# Patient Record
Sex: FEMALE | Race: WHITE | Employment: FULL TIME | URBAN - METROPOLITAN AREA
[De-identification: names, ages, dates, MRNs, and addresses within clinical notes are randomized per-mention and may not be internally consistent; named-entity substitution may affect disease eponyms.]

---

## 2022-02-08 ENCOUNTER — HOSPITAL ENCOUNTER (EMERGENCY)
Age: 32
Discharge: HOME | End: 2022-02-08
Attending: STUDENT IN AN ORGANIZED HEALTH CARE EDUCATION/TRAINING PROGRAM
Payer: MEDICARE

## 2022-02-08 ENCOUNTER — APPOINTMENT (OUTPATIENT)
Dept: GENERAL RADIOLOGY | Age: 32
End: 2022-02-08
Payer: MEDICARE

## 2022-02-08 VITALS
HEIGHT: 67 IN | DIASTOLIC BLOOD PRESSURE: 81 MMHG | HEART RATE: 83 BPM | WEIGHT: 217 LBS | RESPIRATION RATE: 12 BRPM | OXYGEN SATURATION: 100 % | TEMPERATURE: 98.3 F | SYSTOLIC BLOOD PRESSURE: 141 MMHG | BODY MASS INDEX: 34.06 KG/M2

## 2022-02-08 DIAGNOSIS — U07.1 COVID-19: Primary | ICD-10-CM

## 2022-02-08 LAB
SARS-COV-2, RAPID: DETECTED
SPECIMEN DESCRIPTION: ABNORMAL

## 2022-02-08 PROCEDURE — 99283 EMERGENCY DEPT VISIT LOW MDM: CPT

## 2022-02-08 PROCEDURE — 71045 X-RAY EXAM CHEST 1 VIEW: CPT

## 2022-02-08 PROCEDURE — 87635 SARS-COV-2 COVID-19 AMP PRB: CPT

## 2022-02-08 NOTE — ED PROVIDER NOTES
eMERGENCY dEPARTMENT eNCOUnter   Independent Attestation     Pt Name: Blaine Bernstein  MRN: [id number]  [place] 1990  Date of evaluation: 2/8/22     Blaine Bernstein is a 36 y.o. female with CC: Cough and Generalized Body Aches      Based on the medical record the care appears appropriate. I was personally available for consultation in the Emergency Department. The care is provided during an unprecedented national emergency due to the novel coronavirus, COVID 19.     Didier Franco MD  Attending Emergency Physician                    Didier Franco MD  02/08/22 2896

## 2022-02-08 NOTE — ED TRIAGE NOTES
Mode of arrival (squad #, walk in, police, etc) : walk in         Chief complaint(s): Cough, Chest congestion, generalized body aches        Arrival Note (brief scenario, treatment PTA, etc). : patient arrived to ED from home with C/O cough, chest congestion, and generalized body aches. Patient states she began to feel sick this past Saturday and would like to be tested for COVID 19. Patient denies any exposure to a positive covid person. Patient denies any fevers, chills, N/V/D, or abd pain. C= \"Have you ever felt that you should Cut down on your drinking? \"  No  A= \"Have people Annoyed you by criticizing your drinking? \"  No  G= \"Have you ever felt bad or Guilty about your drinking? \"  No  E= \"Have you ever had a drink as an Eye-opener first thing in the morning to steady your nerves or to help a hangover? \"  No      Deferred []      Reason for deferring: N/A    *If yes to two or more: probable alcohol abuse. *

## 2022-02-08 NOTE — Clinical Note
Alicia Kingsley was seen and treated in our emergency department on 2/8/2022. She may return to work on 02/15/2022. If you have any questions or concerns, please don't hesitate to call.       Hiro Cherry PA-C

## 2022-02-08 NOTE — ED PROVIDER NOTES
[facility] [address] [facility] ED  eMERGENCY dEPARTMENT eNCOUnter      Pt Name: Pattie Homans  MRN: [id number]  [place] 1990  Date of evaluation: 2022  Provider: Herman Hollins PA-C    CHIEF COMPLAINT       Chief Complaint   Patient presents with    Cough    Generalized Body Aches           HISTORY OF PRESENT ILLNESS  (Location/Symptom, Timing/Onset, Context/Setting, Quality, Duration, Modifying Factors, Severity.)   Pattie Homans is a 36 y.o. female who presents to the emergency department for evaluation of cough, body aches, chills, sore throat, nausea x4 days. Patient states that her father became sick 5 days ago when she was caring for him. Reports the next day she she became sick. She states she did have a fever yesterday. Reports no fever today. Patient states she is coughing up a lot of mucus. She denies any chest pain, shortness of breath, vomiting, abdominal pain, diarrhea, headache. Patient is concerned for Covid. She is not vaccinated. Patient does vape. Denies any other medical problems. No other complaints. Nursing Notes were reviewed. REVIEW OF SYSTEMS    (2-9 systems for level 4, 10 or more for level 5)     Review of Systems   Cough, body aches, chills, sore throat, nausea, fever    Except as noted above the remainder of the review of systems was reviewed and negative. PAST MEDICAL HISTORY   History reviewed. No pertinent past medical history. None otherwise stated in nurses notes    SURGICAL HISTORY       Past Surgical History:   Procedure Laterality Date     SECTION       None otherwise stated in nurses notes    CURRENT MEDICATIONS       Previous Medications    No medications on file       ALLERGIES     Patient has no known allergies. FAMILY HISTORY     History reviewed. No pertinent family history. No family status information on file.       None otherwise stated in nurses notes    SOCIAL HISTORY      reports that she has been smoking cigarettes and e-cigarettes. She has been smoking about 1.00 pack per day. She has never used smokeless tobacco. She reports current alcohol use. She reports current drug use. Frequency: 1.00 time per week. Drug: Marijuana [place]). lives at home with others     PHYSICAL EXAM    (up to 7 for level 4, 8 or more for level 5)     ED Triage Vitals [02/08/22 1334]   BP Temp Temp Source Pulse Resp SpO2 Height Weight   (!) 141/81 98.3 °F (36.8 °C) Oral 83 12 100 % 5' 7\" (1.702 m) 217 lb (98.4 kg)       Physical Exam   Nursing note and vitals reviewed. Constitutional: Oriented to person, place, and time and well-developed, well-nourished. Head: Normocephalic and atraumatic. Ear: External ears normal.   Nose: Nose normal and midline. Eyes: Conjunctivae and EOM are normal. Pupils are equal, round, and reactive to light. Neck: Normal range of motion. Neck supple. Throat: Posterior pharynx is erythematous with no tonsillar swelling. Uvula is midline. No exudates. Airway is patent. No peritonsillar abscess. Cardiovascular: Normal rate, regular rhythm, normal heart sounds and intact distal pulses. Pulmonary/Chest: Effort normal and breath sounds normal. No respiratory distress. No wheezes. No rales. No Rhonchi. No chest tenderness. Abdominal: Soft. No distension and no mass. There is no tenderness. There is no rebound and no guarding. Musculoskeletal: Normal range of motion. Neurological: Alert and oriented to person, place, and time. GCS score is 15. Skin: Skin is warm and dry. No rash noted. No erythema. No pallor.    Psychiatric: Mood, memory, affect and judgment normal.           DIAGNOSTIC RESULTS     EKG: All EKG's are interpreted by the Emergency Department Physician who either signs or Co-signs this chart in the absence of a cardiologist.        RADIOLOGY:   All plain film, CT, MRI, and formal ultrasound images (except ED bedside ultrasound) are read by the radiologist, see reports below, unless otherwise noted in MDM or here. XR CHEST PORTABLE   Final Result   No acute process. No results found. LABS:  Labs Reviewed   COVID-19, RAPID - Abnormal; Notable for the following components:       Result Value    SARS-CoV-2, Rapid DETECTED (*)     All other components within normal limits       All other labs were within normal range or not returned as of this dictation. EMERGENCY DEPARTMENT COURSE and DIFFERENTIAL DIAGNOSIS/MDM:   Vitals:    Vitals:    02/08/22 1334   BP: (!) 141/81   Pulse: 83   Resp: 12   Temp: 98.3 °F (36.8 °C)   TempSrc: Oral   SpO2: 100%   Weight: 217 lb (98.4 kg)   Height: 5' 7\" (1.702 m)         Patient instructed to return to the emergency room if symptoms worsen, return, or any other concern right away which is agreed by the patient    ED MEDS:  No orders of the defined types were placed in this encounter. CONSULTS:  None    PROCEDURES:  None      FINAL IMPRESSION      1. COVID-19          DISPOSITION/PLAN   DISPOSITION Decision To Discharge    PATIENT REFERRED TO:  [facility] SPECIALIZED SURGERY  [address]  [facility] [address] [phone]  Call       [facility] [address] ED  [address]  [facility] [address] [phone]          DISCHARGE MEDICATIONS:  New Prescriptions    No medications on file         Summation      Patient Course:    URI symptoms x 4 days. Father also sick. No cp, sob, abd pain, emesis. Pt is well appearing. NAD.  VSS. covid is +   Chest xray unremarkable. Recommend OTC medications. Discussed quarantine. Discussed results and plan with the pt. They expressed appropriate understanding. Pt given close follow up, supportive care instructions and strict return instructions at the bedside. The care is provided during an unprecedented national emergency due to the novel coronavirus, COVID-19.       ED Medications administered this visit:  Medications - No data to display    New Prescriptions from this visit: New Prescriptions    No medications on file       Follow-up:  [facility] SPECIALIZED SURGERY  [address]  [address] [facility] ED  [place] [address]  [phone]            Final Impression:   1.  COVID-19               (Please note that portions of this note were completed with a voice recognition program )        Gillian Arango, PA-C  02/08/22 4096

## 2022-02-08 NOTE — ED NOTES
Pt given instructions for follow-up and discharge. Pt verbalizes understanding. Pt is A&O x4, PWD, eupneic, and ambulatory with steady, even gait upon discharge.         Lacie Thompson RN  02/08/22 0983

## 2022-02-08 NOTE — Clinical Note
Thomas Hameed was seen and treated in our emergency department on 2/8/2022. She may return to work on 02/15/2022. If you have any questions or concerns, please don't hesitate to call.       Roselyn Crespo PA-C

## 2023-04-25 ENCOUNTER — OFFICE VISIT (OUTPATIENT)
Dept: PRIMARY CARE CLINIC | Age: 33
End: 2023-04-25

## 2023-04-25 VITALS
HEART RATE: 82 BPM | WEIGHT: 259.6 LBS | OXYGEN SATURATION: 100 % | HEIGHT: 69 IN | SYSTOLIC BLOOD PRESSURE: 136 MMHG | DIASTOLIC BLOOD PRESSURE: 84 MMHG | BODY MASS INDEX: 38.45 KG/M2

## 2023-04-25 DIAGNOSIS — Z59.82 TRANSPORTATION INSECURITY: ICD-10-CM

## 2023-04-25 DIAGNOSIS — M54.50 CHRONIC MIDLINE LOW BACK PAIN WITHOUT SCIATICA: ICD-10-CM

## 2023-04-25 DIAGNOSIS — G89.29 CHRONIC MIDLINE LOW BACK PAIN WITHOUT SCIATICA: ICD-10-CM

## 2023-04-25 DIAGNOSIS — M25.561 PAIN IN BOTH KNEES, UNSPECIFIED CHRONICITY: ICD-10-CM

## 2023-04-25 DIAGNOSIS — G62.9 NEUROPATHY: ICD-10-CM

## 2023-04-25 DIAGNOSIS — M25.562 PAIN IN BOTH KNEES, UNSPECIFIED CHRONICITY: ICD-10-CM

## 2023-04-25 DIAGNOSIS — F81.9 LEARNING DISABILITY: ICD-10-CM

## 2023-04-25 DIAGNOSIS — F41.9 ANXIETY: ICD-10-CM

## 2023-04-25 DIAGNOSIS — R10.9 ABDOMINAL PAIN, UNSPECIFIED ABDOMINAL LOCATION: ICD-10-CM

## 2023-04-25 DIAGNOSIS — Z13.1 SCREENING FOR DIABETES MELLITUS: ICD-10-CM

## 2023-04-25 DIAGNOSIS — Z23 NEED FOR VACCINATION: ICD-10-CM

## 2023-04-25 DIAGNOSIS — Z76.89 ENCOUNTER TO ESTABLISH CARE: Primary | ICD-10-CM

## 2023-04-25 DIAGNOSIS — R73.03 PRE-DIABETES: ICD-10-CM

## 2023-04-25 DIAGNOSIS — E78.5 HYPERLIPIDEMIA, UNSPECIFIED HYPERLIPIDEMIA TYPE: ICD-10-CM

## 2023-04-25 RX ORDER — GABAPENTIN 300 MG/1
300 CAPSULE ORAL 3 TIMES DAILY
COMMUNITY

## 2023-04-25 SDOH — ECONOMIC STABILITY: FOOD INSECURITY: WITHIN THE PAST 12 MONTHS, YOU WORRIED THAT YOUR FOOD WOULD RUN OUT BEFORE YOU GOT MONEY TO BUY MORE.: NEVER TRUE

## 2023-04-25 SDOH — ECONOMIC STABILITY: HOUSING INSECURITY
IN THE LAST 12 MONTHS, WAS THERE A TIME WHEN YOU DID NOT HAVE A STEADY PLACE TO SLEEP OR SLEPT IN A SHELTER (INCLUDING NOW)?: NO

## 2023-04-25 SDOH — ECONOMIC STABILITY: FOOD INSECURITY: WITHIN THE PAST 12 MONTHS, THE FOOD YOU BOUGHT JUST DIDN'T LAST AND YOU DIDN'T HAVE MONEY TO GET MORE.: NEVER TRUE

## 2023-04-25 SDOH — ECONOMIC STABILITY - TRANSPORTATION SECURITY: TRANSPORTATION INSECURITY: Z59.82

## 2023-04-25 SDOH — ECONOMIC STABILITY: INCOME INSECURITY: HOW HARD IS IT FOR YOU TO PAY FOR THE VERY BASICS LIKE FOOD, HOUSING, MEDICAL CARE, AND HEATING?: NOT HARD AT ALL

## 2023-04-25 ASSESSMENT — ENCOUNTER SYMPTOMS
ABDOMINAL DISTENTION: 0
SORE THROAT: 0
PHOTOPHOBIA: 0
ABDOMINAL PAIN: 1
DIARRHEA: 0
VOMITING: 0
CHEST TIGHTNESS: 0
SHORTNESS OF BREATH: 0
CONSTIPATION: 0
COUGH: 0
SINUS PRESSURE: 0
RHINORRHEA: 0

## 2023-04-25 ASSESSMENT — PATIENT HEALTH QUESTIONNAIRE - PHQ9
SUM OF ALL RESPONSES TO PHQ QUESTIONS 1-9: 2
2. FEELING DOWN, DEPRESSED OR HOPELESS: 1
SUM OF ALL RESPONSES TO PHQ QUESTIONS 1-9: 2
1. LITTLE INTEREST OR PLEASURE IN DOING THINGS: 1
SUM OF ALL RESPONSES TO PHQ9 QUESTIONS 1 & 2: 2

## 2023-04-25 NOTE — PROGRESS NOTES
side effects of prescribed medications. All patient questions answered. Pt voiced understanding. Reviewed health maintenance. Instructed to continue current medications, diet and exercise. Patient agreed with treatment plan. Follow up as directed.      Electronically signed by MORENITA Cornelius on 4/25/2023 at 2:32 PM

## 2023-05-09 ENCOUNTER — HOSPITAL ENCOUNTER (OUTPATIENT)
Dept: ULTRASOUND IMAGING | Age: 33
Discharge: HOME | End: 2023-05-11
Payer: MEDICARE

## 2023-05-09 DIAGNOSIS — R10.9 ABDOMINAL PAIN, UNSPECIFIED ABDOMINAL LOCATION: ICD-10-CM

## 2023-05-09 PROCEDURE — 76705 ECHO EXAM OF ABDOMEN: CPT

## 2023-05-17 ENCOUNTER — TELEMEDICINE (OUTPATIENT)
Dept: PRIMARY CARE CLINIC | Age: 33
End: 2023-05-17

## 2023-05-17 ENCOUNTER — SCHEDULED TELEPHONE ENCOUNTER (OUTPATIENT)
Dept: PRIMARY CARE CLINIC | Age: 33
End: 2023-05-17
Payer: MEDICARE

## 2023-05-17 ENCOUNTER — TELEPHONE (OUTPATIENT)
Dept: PRIMARY CARE CLINIC | Age: 33
End: 2023-05-17

## 2023-05-17 DIAGNOSIS — G62.9 NEUROPATHY: Primary | ICD-10-CM

## 2023-05-17 DIAGNOSIS — E66.01 SEVERE OBESITY (BMI 35.0-39.9) WITH COMORBIDITY (HCC): ICD-10-CM

## 2023-05-17 PROCEDURE — 99442 PR PHYS/QHP TELEPHONE EVALUATION 11-20 MIN: CPT | Performed by: PHYSICIAN ASSISTANT

## 2023-05-17 RX ORDER — GABAPENTIN 300 MG/1
300 CAPSULE ORAL 3 TIMES DAILY
Qty: 90 CAPSULE | Refills: 0 | Status: SHIPPED | OUTPATIENT
Start: 2023-05-17 | End: 2023-06-16

## 2023-05-17 RX ORDER — GABAPENTIN 300 MG/1
300 CAPSULE ORAL 3 TIMES DAILY
Qty: 90 CAPSULE | Refills: 0 | Status: SHIPPED | OUTPATIENT
Start: 2023-05-17 | End: 2023-05-17 | Stop reason: SDUPTHER

## 2023-05-17 NOTE — TELEPHONE ENCOUNTER
You just saw her less than a month ago does she need to come in? Neuropathy and chronic back pain were documented and the medication is on her medication list. Please advise.

## 2023-05-17 NOTE — TELEPHONE ENCOUNTER
Please just have patient set up appointment as a telephone visit. I will be modesto to place order thorugh that. I can call her now. Just have to go over a few things about the medication with her.

## 2023-05-17 NOTE — PROGRESS NOTES
Richy Milian is a 34 y.o. female evaluated via telephone on 5/17/2023 for No chief complaint on file. .        Documentation:  I communicated with the patient and/or health care decision maker about gabapentin. Details of this discussion including any medical advice provided: Had been taking 600 mg gabapentin as needed. Patient was educated on controlled substance adverse reactions. No other concerns at this time. Follow up in office in 6 months for med check. PDMP Monitoring:    Last PDMP Patrice Molt as Reviewed:  Review User Review Instant Review Result   DAMIAN MATUTE 4/25/2023  2:12 PM Reviewed PDMP [1]       Urine Drug Screenings (1 yr)    No resulted procedures found. Medication Contract and Consent for Opioid Use Documents Filed        No documents found                      Total Time: minutes: 11-20 minutes    Richy Milian was evaluated through a synchronous (real-time) audio encounter. Patient identification was verified at the start of the visit. She (or guardian if applicable) is aware that this is a billable service, which includes applicable co-pays. This visit was conducted with the patient's (and/or legal guardian's) verbal consent. She has not had a related appointment within my department in the past 7 days or scheduled within the next 24 hours. The patient was located at Home: [facility]  [address]  [address]. The provider was located at [place] [address]): [facility] [address]  [facility] [address]     Note: not billable if this call serves to triage the patient into an appointment for the relevant concern    Green Prabhjot Jefferyma

## 2023-05-18 ENCOUNTER — HOSPITAL ENCOUNTER (OUTPATIENT)
Dept: PHYSICAL THERAPY | Facility: CLINIC | Age: 33
Setting detail: THERAPIES SERIES
Discharge: HOME | End: 2023-05-18
Payer: MEDICARE

## 2023-05-18 PROCEDURE — 97161 PT EVAL LOW COMPLEX 20 MIN: CPT

## 2023-05-18 NOTE — CONSULTS
[address]  Phone: [phone]  Fax: [phone]       Physical Therapy Spine Evaluation    Date:  2023  Patient: Azeb Lee  : 1990  MRN: [id number]  Physician: MORENITA Dacosta   Insurance: MEDICARE ([employer]  Medical Diagnosis: M54.50, G89.29 (ICD-10-CM) - Chronic midline low back pain without sciatica  M25.561, M25.562 (ICD-10-CM) - Pain in both knees, unspecified chronicity  Rehab Codes: M54.5  Onset Date: 23  Next 's appt. : 6/15/23      Subjective:   CC/HPI: Pt reports to PT with low back and flo knee pain. Per pt, she reports that she has been having low back pain ever since  when she was pregnant with her daughter. Pt states that she has issue with her back \"locking up\", noting that she will need help standing back up if she were to bend down to pick something up. Pt also notes that she is unable to sit or stand for long periods d/t pain. Pt reports that heat can help, but does not provide any long-term carryover. Reports that she can also have pin in her upper back at times. Pt states that she had tried PT previously, but states that this was around 2 years ago.      PMHx:   [] Unremarkable               [x] Refer to full medical chart  In [facility]         Radiology: Date Performed: Results:     [] X-RAY     [] MRI     [] Other            Comorbidities:   [] Obesity [] Dialysis  [] N/A   [x] Asthma/COPD [] Dementia [] Other:   [] Stroke [] Sleep apnea [] Other:   [] Vascular disease [] Rheumatic disease [] Other:       Fall Risk:      [x] None   [] PRESENT/See Marques Scale   Medications: [x] Refer to full medical record [] None [] Other:  Allergies:      [] Refer to full medical record [x] None [] Other:        ADL/IADL [] Previously independent with all [] Currently independent with all Who currently assists the patient with task     [x] Previously independent with all except: [x] Currently

## 2023-05-25 ENCOUNTER — HOSPITAL ENCOUNTER (OUTPATIENT)
Dept: PHYSICAL THERAPY | Facility: CLINIC | Age: 33
Setting detail: THERAPIES SERIES
Discharge: HOME | End: 2023-05-25
Payer: MEDICARE

## 2023-05-25 PROCEDURE — 97113 AQUATIC THERAPY/EXERCISES: CPT

## 2023-05-25 NOTE — CARE COORDINATION
Medicare Cap   [x] Physical Therapy  [] Speech Therapy  [] Occupational therapy  *PT and Speech caps combine      $2230 Limit for PT and Speech combined  $2230 Limit for OT individually  At the beginning of the month where you expect to go over $2150, please add the [address] [place] [address] modifier      Patient Name: Fer Alcala: 1990    Note:  This is an estimate of charges billed.      Date of [address] Name # units/ charge $$$ charge Daily Total Charge Ongoing Total $$$           2 aqua    54.32 54.32

## 2023-05-25 NOTE — FLOWSHEET NOTE
[] [address]  [address]  [phone]  F: [phone] [] [address]  [facility]   [address]  P: [phone]  F: [phone] [] [address]  [address] [facility]  P: [phone]  F: [phone] [] [address]  P: [phone]  F: [phone] [] [address]  [facility]   [address]   [place]atherine Ferris: [phone]  F: [phone]      Physical Therapy Daily Aquatic Treatment Note    Date:  2023  Patient Name:  Anh Mooney    :  1990  MRN: [id number]  Physician: MORENITA Hayes                                Insurance: MEDICARE (Med. [employer]ec.)  Medical Diagnosis: M54.50, G89.29 (ICD-10-CM) - Chronic midline low back pain without sciatica  M25.561, M25.562 (ICD-10-CM) - Pain in both knees, unspecified chronicity  Rehab Codes: M54.5  Onset Date: 23               Next 's appt. : 6/15/23  Visit# / total visits: ; Progress note for Medicare patient due at visit 10     Cancels/No Shows:     Subjective:    Pain:  [x] Yes  [] No Location: B knees Pain Rating: (0-10 scale) 3/10  Pain altered Tx:  [] No  [] Yes  Action:  Comments: Pt reports pain bilateral knees today. States she is ready for start PT.      Objective:  Precautions:  KEY  B = Belt G = Gloves P= Paddles   C = Collar K = Kickboard T = Theratube   DB = Dumbells N = Noodle W = Weights     Exercises/Activities  Warm-up/Amb  Dynamic Exercises    Forward 3L March 3L   Sideways 3L Squat    Backwards 3L Retro HS curls      Retro SLR    Stretches  Braiding    Gastroc/Soleus  Heel to Toe amb    Hamstring 3x30\" Toe amb    Hip flexor  Heel amb    Piriformis      SKTC      Pec Stretch      Post Deltoid  Static Exercises UE      Shoulder

## 2023-06-01 ENCOUNTER — HOSPITAL ENCOUNTER (OUTPATIENT)
Dept: PHYSICAL THERAPY | Facility: CLINIC | Age: 33
Setting detail: THERAPIES SERIES
Discharge: HOME | End: 2023-06-01
Payer: MEDICARE

## 2023-06-01 PROCEDURE — 97113 AQUATIC THERAPY/EXERCISES: CPT

## 2023-06-01 NOTE — CARE COORDINATION
Medicare Cap   [x] Physical Therapy  [] Speech Therapy  [] Occupational therapy  *PT and Speech caps combine      $2230 Limit for PT and Speech combined  $2230 Limit for OT individually  At the beginning of the month where you expect to go over $2150, please add the [address] [place] [address] modifier      Patient Name: Kiera Maury: 1990    Note:  This is an estimate of charges billed.      Date of [address] Name # units/ charge $$$ charge Daily Total Charge Ongoing Total $$$           2 aqua    54.32 54.32   2 aqua    54.32 108.64

## 2023-06-08 ENCOUNTER — HOSPITAL ENCOUNTER (OUTPATIENT)
Dept: PHYSICAL THERAPY | Facility: CLINIC | Age: 33
Setting detail: THERAPIES SERIES
Discharge: HOME | End: 2023-06-08
Payer: MEDICARE

## 2023-06-08 PROCEDURE — 97113 AQUATIC THERAPY/EXERCISES: CPT

## 2023-06-08 NOTE — FLOWSHEET NOTE
[] [facility] [address]  [address]  [phone]  F: [phone] [] [address]  [facility]   [address]  P: [phone]  F: [phone] [x] [address]  [address]  P: [phone]  F: [phone] [] [address]  P: [phone]  F: [phone] [] [address]  [facility]   [address]   [place]: [phone]  F: [phone]      Physical Therapy Daily Aquatic Treatment Note    Date:  2023  Patient Name:  Evangelina Haney    :  1990  MRN: [id number]  Physician: MORENITA Burnham                                Insurance: MEDICARE (Med. Nec.)  Medical Diagnosis: M54.50, G89.29 (ICD-10-CM) - Chronic midline low back pain without sciatica  M25.561, M25.562 (ICD-10-CM) - Pain in both knees, unspecified chronicity  Rehab Codes: M54.5  Onset Date: 23               Next 's appt. : 6/15/23  Visit# / total visits: ; Progress note for Medicare patient due at visit 10     Cancels/No Shows:     Subjective:    Pain:  [x] Yes  [] No Location: B knees Pain Rating: (0-10 scale) 5/10  Pain altered Tx:  [] No  [] Yes  Action:  Comments: Pt mentioned that she had a lot of knee pain today but has decreased since this morning.       Objective:  Precautions:  KEY  B = Belt G = Gloves P= Paddles   C = Collar K = Kickboard T = Theratube   DB = Dumbells N = Noodle W = Weights     Exercises/Activities  Warm-up/Amb 6/8 Dynamic Exercises 6/8   Forward 3L March 3L   Sideways 3L Squat    Backwards 3L Retro HS curls      Retro SLR    Stretches  Braiding    Gastroc/Soleus  Heel to Toe amb    Hamstring 3x30\" Toe amb    Hip flexor  Heel amb    Piriformis      SKTC      Pec Stretch      Post Deltoid  Static Exercises UE

## 2023-06-20 ENCOUNTER — HOSPITAL ENCOUNTER (OUTPATIENT)
Dept: PHYSICAL THERAPY | Facility: CLINIC | Age: 33
Setting detail: THERAPIES SERIES
Discharge: HOME | End: 2023-06-20
Payer: MEDICARE

## 2023-06-20 PROCEDURE — 97113 AQUATIC THERAPY/EXERCISES: CPT

## 2023-06-20 NOTE — FLOWSHEET NOTE
[] [address]  [address]  [phone]  F: [phone] [] [address]  [facility]   [address]  P: [phone]  F: [phone] [x] [address]  [address] [facility] [address]  P: [phone]  F: [phone] [] [address]  P: [phone]  F: [phone] [] [address]  [facility]   [address]   [place]: [phone]  F: [phone]      Physical Therapy Daily Aquatic Treatment Note    Date:  2023  Patient Name:  Zahida Munson    :  1990  MRN: [id number]  Physician: MORENITA Mccullough                                Insurance: MEDICARE (Med. Nec.)  Medical Diagnosis: M54.50, G89.29 (ICD-10-CM) - Chronic midline low back pain without sciatica  M25.561, M25.562 (ICD-10-CM) - Pain in both knees, unspecified chronicity  Rehab Codes: M54.5  Onset Date: 23               Next 's appt. : 6/15/23  Visit# / total visits: ; Progress note for Medicare patient due at visit 10     Cancels/No Shows:     Subjective:    Pain:  [x] Yes  [] No Location: B knees Pain Rating: (0-10 scale) 2/10  Pain altered Tx:  [] No  [] Yes  Action:  Comments: Pt reports tweaking her upper back earlier with inc to \"5/10\" pain. Has lost 9# since starting therapy.      Objective:  Precautions:  KEY  B = Belt G = Gloves P= Paddles   C = Collar K = Kickboard T = Theratube   DB = Dumbells N = Noodle W = Weights     Exercises/Activities  Warm-up/Amb  Dynamic Exercises    Forward 3L March 3L   Sideways 3L Squat    Backwards 3L Retro HS curls      Retro SLR    Stretches  Braiding    Gastroc/Soleus 3x30\" Heel to Toe amb    Hamstring 3x30\" Toe amb    Hip flexor  Heel amb    Piriformis      SKTC      Pec Stretch      Post Deltoid

## 2023-06-20 NOTE — CARE COORDINATION
Medicare Cap   [x] Physical Therapy  [] Speech Therapy  [] Occupational therapy  *PT and Speech caps combine      $2230 Limit for PT and Speech combined  $2230 Limit for OT individually  At the beginning of the month where you expect to go over $2150, please add the [address] [place] [address] modifier      Patient Name: Kristine Shettyop: 1990    Note:  This is an estimate of charges billed.      Date of [address] Name # units/ charge $$$ charge Daily Total Charge Ongoing Total $$$           2 aqua    54.32 54.32   2 aqua    54.32 108.64   6/8 2 aq   54.32 162.96   6/13 2 aq   54.32 217.28   6/20 2 aq   54.32 271.60

## 2023-06-22 ENCOUNTER — HOSPITAL ENCOUNTER (OUTPATIENT)
Dept: PHYSICAL THERAPY | Facility: CLINIC | Age: 33
Setting detail: THERAPIES SERIES
End: 2023-06-22
Payer: MEDICARE

## 2023-06-26 ENCOUNTER — APPOINTMENT (OUTPATIENT)
Dept: PHYSICAL THERAPY | Facility: CLINIC | Age: 33
End: 2023-06-26
Payer: MEDICARE

## 2023-06-29 ENCOUNTER — HOSPITAL ENCOUNTER (OUTPATIENT)
Dept: PHYSICAL THERAPY | Facility: CLINIC | Age: 33
Setting detail: THERAPIES SERIES
Discharge: HOME | End: 2023-06-29
Payer: MEDICARE

## 2023-06-29 PROCEDURE — 97113 AQUATIC THERAPY/EXERCISES: CPT

## 2023-08-07 ENCOUNTER — HOSPITAL ENCOUNTER (OUTPATIENT)
Dept: PHYSICAL THERAPY | Facility: CLINIC | Age: 33
Setting detail: THERAPIES SERIES
Discharge: HOME | End: 2023-08-07

## 2023-08-14 ENCOUNTER — TELEPHONE (OUTPATIENT)
Dept: PRIMARY CARE CLINIC | Age: 33
End: 2023-08-14

## 2023-08-14 NOTE — TELEPHONE ENCOUNTER
Carlton Tellez was contacted by a [employer] to discuss a referral for SDOH related needs. Writer spoke with: Patient and explained the services and assistance that can be provided through the [employer] program.     Patient not agreeable to receiving resources and support from writer. Intake Notes: Patient told me she calls [employer] for rides, and they're helping. She also told me that she is homeless and living in a hotel. In between hotel stays, she said she stays at her Dad's. She is trying to save up money to rent her own home. I let her know that there are agencies in which she can apply for rent assistance, security deposit, utility assistance, etc.   She said when she is ready to rent, she will look into assistance. Plan of Care: N/A    Action steps to be completed by writer: Close referral.    Action steps to be completed by patient:  Patient will call [employer] when she needs a ride.     Patient has given verbal permission to leave detailed messages regarding SDOH referral on their phone: N/A    Patient has given verbal permission to submit applications on their behalf: N/A    Patient voiced understanding of action plan and responsibilities, was provided with writer's contact information, and agrees to call should they require additional assistance: N/A      Niki Ritter MA

## 2023-09-01 ENCOUNTER — OFFICE VISIT (OUTPATIENT)
Dept: PRIMARY CARE CLINIC | Age: 33
End: 2023-09-01
Payer: MEDICARE

## 2023-09-01 VITALS
SYSTOLIC BLOOD PRESSURE: 122 MMHG | WEIGHT: 250.4 LBS | DIASTOLIC BLOOD PRESSURE: 86 MMHG | BODY MASS INDEX: 37.09 KG/M2 | HEIGHT: 69 IN | HEART RATE: 83 BPM | OXYGEN SATURATION: 97 %

## 2023-09-01 DIAGNOSIS — G62.9 NEUROPATHY: ICD-10-CM

## 2023-09-01 DIAGNOSIS — F41.9 ANXIETY: ICD-10-CM

## 2023-09-01 DIAGNOSIS — M54.50 CHRONIC MIDLINE LOW BACK PAIN WITHOUT SCIATICA: Primary | ICD-10-CM

## 2023-09-01 DIAGNOSIS — G89.29 CHRONIC MIDLINE LOW BACK PAIN WITHOUT SCIATICA: Primary | ICD-10-CM

## 2023-09-01 PROCEDURE — G8427 DOCREV CUR MEDS BY ELIG CLIN: HCPCS | Performed by: PHYSICIAN ASSISTANT

## 2023-09-01 PROCEDURE — G8417 CALC BMI ABV UP PARAM F/U: HCPCS | Performed by: PHYSICIAN ASSISTANT

## 2023-09-01 PROCEDURE — 4004F PT TOBACCO SCREEN RCVD TLK: CPT | Performed by: PHYSICIAN ASSISTANT

## 2023-09-01 PROCEDURE — 99214 OFFICE O/P EST MOD 30 MIN: CPT | Performed by: PHYSICIAN ASSISTANT

## 2023-09-01 RX ORDER — FLUOXETINE HYDROCHLORIDE 20 MG/1
20 CAPSULE ORAL DAILY
Qty: 90 CAPSULE | Refills: 1 | Status: SHIPPED | OUTPATIENT
Start: 2023-09-01

## 2023-09-01 RX ORDER — GABAPENTIN 300 MG/1
600 CAPSULE ORAL 3 TIMES DAILY
Qty: 180 CAPSULE | Refills: 0 | Status: SHIPPED | OUTPATIENT
Start: 2023-09-01 | End: 2023-10-01

## 2023-09-01 RX ORDER — MELOXICAM 15 MG/1
15 TABLET ORAL DAILY PRN
Qty: 30 TABLET | Refills: 0 | Status: SHIPPED | OUTPATIENT
Start: 2023-09-01

## 2023-09-01 ASSESSMENT — ENCOUNTER SYMPTOMS
NAUSEA: 0
COUGH: 0
BACK PAIN: 1
WHEEZING: 0

## 2023-09-01 NOTE — PROGRESS NOTES
Pulses: Normal pulses. Heart sounds: Normal heart sounds. Pulmonary:      Effort: Pulmonary effort is normal. No respiratory distress. Breath sounds: Normal breath sounds. Musculoskeletal:      Cervical back: Neck supple. Skin:     General: Skin is dry. Neurological:      Mental Status: She is alert and oriented to person, place, and time. Psychiatric:         Mood and Affect: Mood normal.         Behavior: Behavior normal.         Thought Content: Thought content normal.       Assessment and Plan:          1. Chronic midline low back pain without sciatica  -     meloxicam (MOBIC) 15 MG tablet; Take 1 tablet by mouth daily as needed for Pain, Disp-30 tablet, R-0Normal  -     Peg Caceres MD, Pain Management, [place]  2. Neuropathy  -     gabapentin (NEURONTIN) 300 MG capsule; Take 2 capsules by mouth 3 times daily for 30 days. , Disp-180 capsule, R-0Normal  3. Anxiety  -     FLUoxetine (PROZAC) 20 MG capsule; Take 1 capsule by mouth daily, Disp-90 capsule, R-1Normal   Will get back in with PT. No follow-ups on file. Patient given educational materials - see patient instructions. Discussed use, benefit, and side effects of prescribed medications. All patient questions answered. Pt voiced understanding. Reviewed health maintenance. Instructed to continue current medications, diet and exercise. Patient agreed with treatment plan. Follow up as directed.      Electronically signed by MORENITA Spicer on 9/1/2023 at 3:38 PM

## 2023-11-13 ENCOUNTER — INITIAL CONSULT (OUTPATIENT)
Dept: PAIN MANAGEMENT | Age: 33
End: 2023-11-13
Payer: MEDICARE

## 2023-11-13 VITALS — BODY MASS INDEX: 37.03 KG/M2 | HEIGHT: 69 IN | WEIGHT: 250 LBS

## 2023-11-13 DIAGNOSIS — M46.1 SACROILIITIS (HCC): ICD-10-CM

## 2023-11-13 DIAGNOSIS — M54.50 CHRONIC BILATERAL LOW BACK PAIN, UNSPECIFIED WHETHER SCIATICA PRESENT: Primary | ICD-10-CM

## 2023-11-13 DIAGNOSIS — G89.29 CHRONIC BILATERAL THORACIC BACK PAIN: ICD-10-CM

## 2023-11-13 DIAGNOSIS — M54.6 CHRONIC BILATERAL THORACIC BACK PAIN: ICD-10-CM

## 2023-11-13 DIAGNOSIS — G89.29 CHRONIC BILATERAL LOW BACK PAIN, UNSPECIFIED WHETHER SCIATICA PRESENT: Primary | ICD-10-CM

## 2023-11-13 PROCEDURE — G8417 CALC BMI ABV UP PARAM F/U: HCPCS | Performed by: PAIN MEDICINE

## 2023-11-13 PROCEDURE — G8484 FLU IMMUNIZE NO ADMIN: HCPCS | Performed by: PAIN MEDICINE

## 2023-11-13 PROCEDURE — 4004F PT TOBACCO SCREEN RCVD TLK: CPT | Performed by: PAIN MEDICINE

## 2023-11-13 PROCEDURE — 99204 OFFICE O/P NEW MOD 45 MIN: CPT | Performed by: PAIN MEDICINE

## 2023-11-13 PROCEDURE — G8427 DOCREV CUR MEDS BY ELIG CLIN: HCPCS | Performed by: PAIN MEDICINE

## 2023-11-13 ASSESSMENT — ENCOUNTER SYMPTOMS
BACK PAIN: 1
BOWEL INCONTINENCE: 0

## 2023-11-13 NOTE — PROGRESS NOTES
perception normal.         Mood and Affect: Mood and affect normal.         Orders:  Orders Placed This Encounter   Procedures    MRI LUMBAR SPINE WO CONTRAST    MRI THORACIC SPINE WO CONTRAST    XR THORACIC SPINE (3 VIEWS)    XR LUMBAR SPINE (2-3 VIEWS)         Assessment:  1. Chronic bilateral low back pain, unspecified whether sciatica present    2. Chronic bilateral thoracic back pain    3. Sacroiliitis [facility])        Treatment Plan:  DISCUSSION: Treatment options discussed with patient and all questions answered to patient's satisfaction. Risks, benefits, alternatives of treatment discussed with patient. OARRS Review: Reviewed and acceptable for medications prescribed. TREATMENT OPTIONS:     Discussed different treatment options including continued conservative care such as physical therapy, chiropractic care, acupuncture. Discussed different interventional options such as epidural steroids or medial branch blocks. Also discussed neuromodulation in the form of spinal cord stimulation. Also discussed surgical evaluation. I believe further imaging is appropriate. X-ray lumbar and thoracic spine. MRI lumbar thoracic spine. MRI of the Thoracic and Lumbar spine is indicated to further evaluate pathology and guide treatment plan. Consider injection therapies versus surgical evaluation based on imaging results. Samara Delgado M.D. I have reviewed the chief complaint and history of present illness (including ROS and PFSH) and vital documentation by my staff and I agree with their documentation and have added where applicable.

## 2023-11-20 ENCOUNTER — OFFICE VISIT (OUTPATIENT)
Dept: PRIMARY CARE CLINIC | Age: 33
End: 2023-11-20
Payer: MEDICARE

## 2023-11-20 VITALS
OXYGEN SATURATION: 98 % | HEIGHT: 68 IN | WEIGHT: 257.4 LBS | DIASTOLIC BLOOD PRESSURE: 80 MMHG | SYSTOLIC BLOOD PRESSURE: 120 MMHG | HEART RATE: 90 BPM | BODY MASS INDEX: 39.01 KG/M2

## 2023-11-20 DIAGNOSIS — G89.29 CHRONIC MIDLINE LOW BACK PAIN WITHOUT SCIATICA: ICD-10-CM

## 2023-11-20 DIAGNOSIS — M54.50 CHRONIC MIDLINE LOW BACK PAIN WITHOUT SCIATICA: ICD-10-CM

## 2023-11-20 DIAGNOSIS — Z13.39 ADHD (ATTENTION DEFICIT HYPERACTIVITY DISORDER) EVALUATION: Primary | ICD-10-CM

## 2023-11-20 PROCEDURE — G8484 FLU IMMUNIZE NO ADMIN: HCPCS | Performed by: PHYSICIAN ASSISTANT

## 2023-11-20 PROCEDURE — G8427 DOCREV CUR MEDS BY ELIG CLIN: HCPCS | Performed by: PHYSICIAN ASSISTANT

## 2023-11-20 PROCEDURE — 99214 OFFICE O/P EST MOD 30 MIN: CPT | Performed by: PHYSICIAN ASSISTANT

## 2023-11-20 PROCEDURE — 4004F PT TOBACCO SCREEN RCVD TLK: CPT | Performed by: PHYSICIAN ASSISTANT

## 2023-11-20 PROCEDURE — G8417 CALC BMI ABV UP PARAM F/U: HCPCS | Performed by: PHYSICIAN ASSISTANT

## 2023-11-20 RX ORDER — MELOXICAM 15 MG/1
15 TABLET ORAL DAILY PRN
Qty: 30 TABLET | Refills: 0 | Status: SHIPPED | OUTPATIENT
Start: 2023-11-20

## 2023-11-20 ASSESSMENT — ENCOUNTER SYMPTOMS
ABDOMINAL PAIN: 0
SHORTNESS OF BREATH: 0
CONSTIPATION: 0
CHEST TIGHTNESS: 0
DIARRHEA: 0
SORE THROAT: 0
COUGH: 0
ABDOMINAL DISTENTION: 0

## 2023-12-04 ENCOUNTER — HOSPITAL ENCOUNTER (OUTPATIENT)
Dept: PHYSICAL THERAPY | Facility: CLINIC | Age: 33
Setting detail: THERAPIES SERIES
Discharge: HOME | End: 2023-12-04
Payer: MEDICARE

## 2023-12-04 PROCEDURE — 97113 AQUATIC THERAPY/EXERCISES: CPT

## 2023-12-04 PROCEDURE — 97110 THERAPEUTIC EXERCISES: CPT

## 2023-12-04 PROCEDURE — 97162 PT EVAL MOD COMPLEX 30 MIN: CPT

## 2023-12-04 NOTE — CARE COORDINATION
Signed                                             Medicare Cap   [x] Physical Therapy   [] Speech Therapy      [] Occupational therapy  *PT and Speech caps combine                                                                                  $2230 Limit for PT and Speech combined  $2230 Limit for OT individually  At the beginning of the month where you expect to go over $2150, please add the [facility] modifier       Patient Name: Jelena Duong: 1990     Note:  This is an estimate of charges billed.               Date of 705 [facility] Name # units/ charge $$$ charge Daily Total Charge Ongoing Total $$$                 2 aqua       54.32 54.32   2 aqua       54.32 108.64   6/8 2 aq     54.32 162.96   6/13 2 aq     54.32 217.28   6/20 2 aq     54.32 271.60   6/29 2 aq     54.32 325.92   12/4 Eval/elaine   05682+80.33  117.70  443.62

## 2023-12-04 NOTE — CONSULTS
program in 3-4 visits. No specific limitation was observed other than generally deconditioned and weak frontal plane stabilizers. Issued a HEP for basic level core ex. Problem list, as detailed above:   [x] ? Back Pain:    [] ? Cervical Pain:    [] ? ROM:     [x] ? Strength:   [x] ? Function:  [] Postural Deviations  [] Gait Deviations  [] Other:   Goals  MET NOT MET ON-  GOING  Details   Date Addressed:            STG: To be met in 8 treatments            1. ? Pain: Decrease pain levels to 5/10 with all sitting and standing to ease standing and sitting at work. []  []  []      2. ? ROM: Increase flexibility in flo LEs and low back to help improve lumbar ROM to WNL in all directions with no pain, reducing need for compensations with any bending while at work. []  []  []      3. Improve score on assessment tool Modified Oswestry from 32% impairment to less than 22% impairment, demonstrating improved tolerance to activity to help pt return to running. []  []  []      4. Independent with Home Exercise Programs []  []  []        []  []  []      Date Addressed:            LTG: To be met in 16 treatments           1. Improve score on assessment tool Modified Oswestry from 32% impairment to less than 12% impairment, demonstrating improved tolerance to activity to help pt return to running. []  []  []      2. Reduce pain levels to 2/10 or less with activity to help ease ability to return to all running and playing basketball. []  []  []      3. ? Strength: Increase flo hip/core strength to 4+/5 grossly to help improve trunk and LE stability with running and playing basketball. []  []  []                        Patient goals: \"To be able to bend and stand longer than 2-3 minutes at a time. \"    Rehab Potential:  [] Good  [x] Fair  [] Poor   Suggested Professional Referral:  [x] No  [] Yes:  Barriers to Goal Achievement:  [x] No  [] Yes:  Domestic Concerns:  [x] No  [] Yes:    Pt. Education:  [x] Plans/Goals,

## 2023-12-04 NOTE — CARE COORDINATION
Medicare Cap   [x] Physical Therapy  [] Speech Therapy  [] Occupational therapy  *PT and Speech caps combine      $2230 Limit for PT and Speech combined  $2230 Limit for OT individually  At the beginning of the month where you expect to go over $2150, please add the [facility] modifier      Patient Name: Areli Stains: 1990    Note:  This is an estimate of charges billed.      Date of 705 [facility] Name # units/ charge $$$ charge Daily Total Charge Ongoing Total $$$           2 aqua    54.32 54.32   2 aqua    54.32 108.64   6/8 2 aq   54.32 162.96   6/13 2 aq   54.32 217.28   6/20 2 aq   54.32 271.60   6/29 2 aq   54.32 325.92           12/4/23 2 aqua  30.74+22.57 53.31 379.23   12/4/23 Eval+TE

## 2023-12-04 NOTE — FLOWSHEET NOTE
[] [facility] &  Therapy  [address]  [phone]  F: [phone] [] 204 [facility]  [address]   [address]  P: [phone]  F: [phone] [x] [facility]  Outpatient Rehabilitation &  Therapy  [address]  P: [phone]  F: [phone] [] Eric Robb  P: [phone]  F: [phone] [] 224 [facility]  [address]   [address]   [place]: [phone]  F: [phone]      Physical Therapy Daily Aquatic Treatment Note    Date:  2023  Patient Name:  Lynne Petty    :  1990  MRN: [id number]  Physician: Ariella XAVIER                                  Insurance: Medicare  Medical Diagnosis: chronic LBP                  Rehab Codes: M54.5  Onset Date: 23             Next 's appt.:     Visit# / total visits: 1/10; Progress note for Medicare patient due at visit 10     Cancels/No Shows:     Subjective:    Pain:  [x] Yes  [] No Location: LB/B knees Pain Rating: (0-10 scale) 6-7/10  Pain altered Tx:  [x] No  [] Yes  Action:  Comments: Pt arrives after eval to start aquatics. Has not been here for 3-4 months due to not having a ride.   Objective:  Precautions:  KEY  B = Belt G = Gloves P= Paddles   C = Collar K = Kickboard T = Theratube   DB = Dumbells N = Noodle W = Weights     Exercises/Activities  Warm-up/Amb 12/4 Dynamic Exercises 12/4   Forward 3L March    Sideways 3L Squat    Backwards 3L Retro HS curls      Retro SLR    Stretches  Braiding    Gastroc/Soleus 3x30\" Heel to Toe amb    Hamstring 3x30\" Toe amb    Hip flexor  Heel amb    Piriformis      SKTC      Pec Stretch      Post Deltoid  Static Exercises UE TA contr     Shoulder flex/ext    Static Exercises LE  Shoulder abd/add    Heel/toe raises 10 Shoulder H.  abd/add 10

## 2023-12-05 ENCOUNTER — HOSPITAL ENCOUNTER (OUTPATIENT)
Dept: MRI IMAGING | Age: 33
Discharge: HOME | End: 2023-12-07
Attending: PAIN MEDICINE
Payer: MEDICARE

## 2023-12-05 DIAGNOSIS — M54.6 CHRONIC BILATERAL THORACIC BACK PAIN: ICD-10-CM

## 2023-12-05 DIAGNOSIS — M46.1 SACROILIITIS (HCC): ICD-10-CM

## 2023-12-05 DIAGNOSIS — G89.29 CHRONIC BILATERAL LOW BACK PAIN, UNSPECIFIED WHETHER SCIATICA PRESENT: ICD-10-CM

## 2023-12-05 DIAGNOSIS — G89.29 CHRONIC BILATERAL THORACIC BACK PAIN: ICD-10-CM

## 2023-12-05 DIAGNOSIS — M54.50 CHRONIC BILATERAL LOW BACK PAIN, UNSPECIFIED WHETHER SCIATICA PRESENT: ICD-10-CM

## 2023-12-05 PROCEDURE — 72146 MRI CHEST SPINE W/O DYE: CPT

## 2023-12-05 PROCEDURE — 72148 MRI LUMBAR SPINE W/O DYE: CPT

## 2023-12-14 ENCOUNTER — HOSPITAL ENCOUNTER (OUTPATIENT)
Dept: PHYSICAL THERAPY | Facility: CLINIC | Age: 33
Setting detail: THERAPIES SERIES
Discharge: HOME | End: 2023-12-14
Payer: MEDICARE

## 2023-12-14 PROCEDURE — 97113 AQUATIC THERAPY/EXERCISES: CPT

## 2023-12-14 NOTE — FLOWSHEET NOTE
[] [address]  [address]  [phone]  F: [phone] [] 204 [facility]  [address]   [address]  P: [phone]  F: [phone] [x] 120 [facility] &  Therapy  [address]  P: [phone]  F: [phone] [] Eric Robb  P: [phone]  F: [phone] [] [address]  [address]   [address]   [place]: [phone]  F: [phone]      Physical Therapy Daily Aquatic Treatment Note    Date:  2023  Patient Name:  Fabiola Andersen    :  1990  MRN: [id number]  Physician: Chao XAVIER                                  Insurance: Medicare  Medical Diagnosis: chronic LBP                  Rehab Codes: M54.5  Onset Date: 23             Next 's appt.:     Visit# / total visits: 2/10; Progress note for Medicare patient due at visit 10     Cancels/No Shows:     Subjective:    Pain:  [x] Yes  [] No Location: LB/B knees Pain Rating: (0-10 scale) 5-6/10  Pain altered Tx:  [x] No  [] Yes  Action:  Comments: Pt states high levels on pain in her LB at arrival, but mentions that it was worse this morning. States her L posterior ankle was very sore at work yesterday. Had MRI last week, going over results with Dr. Maddy Joya on Monday.    Objective:  Precautions:  KEY  B = Belt G = Gloves P= Paddles   C = Collar K = Kickboard T = Theratube   DB = Dumbells N = Noodle W = Weights     Exercises/Activities  Warm-up/Amb  Dynamic Exercises 14   Forward 3L March 2L   Sideways 3L Squat    Backwards 3L Retro HS curls      Retro SLR    Stretches  Braiding    Gastroc/Soleus 3x30\" wall Heel to Toe amb    Hamstring 3x30\" Toe amb    Hip flexor  Heel amb    Piriformis      SKTC      Pec Stretch      Post Deltoid  Static

## 2023-12-14 NOTE — CARE COORDINATION
Medicare Cap   [x] Physical Therapy  [] Speech Therapy  [] Occupational therapy  *PT and Speech caps combine      $2230 Limit for PT and Speech combined  $2230 Limit for OT individually  At the beginning of the month where you expect to go over $2150, please add the [facility] modifier      Patient Name: Ciro Garza: 1990    Note:  This is an estimate of charges billed.      Date of 705 [facility] Name # units/ charge $$$ charge Daily Total Charge Ongoing Total $$$           2 aqua    54.32 54.32   2 aqua    54.32 108.64   6/8 2 aq   54.32 162.96   6/13 2 aq   54.32 217.28   6/20 2 aq   54.32 271.60   6/29 2 aq   54.32 325.92           12/4/23 2 aqua  30.74+22.57 53.31 379.23   12/4/23 Eval+TE       12/14/23 2 aqua   53.31 432.54

## 2023-12-15 DIAGNOSIS — G89.29 CHRONIC MIDLINE LOW BACK PAIN WITHOUT SCIATICA: ICD-10-CM

## 2023-12-15 DIAGNOSIS — M54.50 CHRONIC MIDLINE LOW BACK PAIN WITHOUT SCIATICA: ICD-10-CM

## 2023-12-15 RX ORDER — MELOXICAM 15 MG/1
TABLET ORAL
Qty: 30 TABLET | Refills: 1 | Status: SHIPPED | OUTPATIENT
Start: 2023-12-15

## 2023-12-28 ENCOUNTER — HOSPITAL ENCOUNTER (OUTPATIENT)
Dept: PHYSICAL THERAPY | Facility: CLINIC | Age: 33
Setting detail: THERAPIES SERIES
Discharge: HOME | End: 2023-12-28
Payer: MEDICARE

## 2023-12-28 PROCEDURE — 97113 AQUATIC THERAPY/EXERCISES: CPT

## 2023-12-28 NOTE — CARE COORDINATION
Medicare Cap   [x] Physical Therapy  [] Speech Therapy  [] Occupational therapy  *PT and Speech caps combine      $2230 Limit for PT and Speech combined  $2230 Limit for OT individually  At the beginning of the month where you expect to go over $2150, please add the [facility] modifier      Patient Name: Jelena Duong: 1990    Note:  This is an estimate of charges billed.      Date of 705 [facility] Name # units/ charge $$$ charge Daily Total Charge Ongoing Total $$$           2 aqua    54.32 54.32   2 aqua    54.32 108.64   6/8 2 aq   54.32 162.96   6/13 2 aq   54.32 217.28   6/20 2 aq   54.32 271.60   6/29 2 aq   54.32 325.92           12/4/23 2 aqua  30.74+22.57 53.31 379.23   12/4/23 Eval+TE       12/14/23 2 aqua   53.31 432.54   12/22 2 aqua   53.31 485.85   12/28 2 aqua   53.31 539.16

## 2023-12-28 NOTE — FLOWSHEET NOTE
Shoulder H.  abd/add 20   Marches 20 Shoulder IR/ER    Mini-squats 20 Rowing 20   4-way hip  20 Arm Circles    Hamstring curls 20 UT shrugs/rolls    Hip Circles/Fig 8  Scap squeezes    Ankle ROM  Diagonals 1/2    Lunges   Elbow flex/ext      Pron/Sup    Functional Exercise  Wrist AROM    Step      Wall Push-ups  Deep H20/    SLS  Bike 3m   Breast Stroke on Noodle  Hip abd/add 3m   Noodle Twist 10 Hip flex/ext    Noodle Push down  Hip IR/ER    Kickboard push/pull  Knee flex/ext      Push/pull on [employer]     Specific Instructions for subsequent treatments: Advance to land based program at visit 4-5    Treatment Charges: Mins Units   []  Modalities     []  Ther Exercise     []  Manual Therapy     []  Ther Activities     []  Neuro Re-ed     []  Vasocompression     [] Gait     [x]  Aquatics 30 2   Total Treatment time         Assessment: [x] Progressing toward goals. Cont with aquatic ex as noted in log above with good tolerance. Progressed pt with increased reps across program. Able to increase time for deep water hip abd/add without issue. Cues with ex for TA activation for lumbar support, also to reduce UE reliance to challenge stability. No pain behaviors observed with completion of ex. Ended with deep water ex for further strengthening and pain relief. Plan for 1 more visit in the pool then she is scheduled to begin land therapy. [] No change.      [] Other:     [x] Patient would continue to benefit from skilled physical therapy services in order to: meet goals listed below     Goals  MET NOT MET ON-  GOING  Details   Date Addressed:            STG: To be met in 8 treatments            1. ? Pain: Decrease pain levels to 5/10 with all sitting and standing to ease standing and sitting at work. []  []  []      2. ? ROM: Increase flexibility in flo LEs and low back to help improve lumbar ROM to WNL in all directions with no pain, reducing need for compensations with any bending while at work. []  []  []

## 2024-01-04 ENCOUNTER — HOSPITAL ENCOUNTER (OUTPATIENT)
Dept: PHYSICAL THERAPY | Facility: CLINIC | Age: 34
Setting detail: THERAPIES SERIES
Discharge: HOME | End: 2024-01-04
Payer: MEDICARE

## 2024-01-04 PROCEDURE — 97113 AQUATIC THERAPY/EXERCISES: CPT

## 2024-01-04 NOTE — CARE COORDINATION
Medicare Cap   [x] Physical Therapy  [] Speech Therapy  [] Occupational therapy  *PT and Speech caps combine      $2230 Limit for PT and Speech combined  $2230 Limit for OT individually  At the beginning of the month where you expect to go over $2150, please add the [facility] modifier      Patient Name: Ayaka Coughlin  YOB: 1990    Note:  This is an estimate of charges billed.     Date of [place] Charge Name # units/ charge $$$ charge Daily Total Charge Ongoing Total $$$           2 aqua    54.32 54.32   2 aqua    54.32 108.64   6/8 2 aq   54.32 162.96   6/13 2 aq   54.32 217.28   6/20 2 aq   54.32 271.60   6/29 2 aq   54.32 325.92           12/4/23 2 aqua  30.74+22.57 53.31 379.23   12/4/23 Eval+TE       12/14/23 2 aqua   53.31 432.54   12/22 2 aqua   53.31 485.85   12/28 2 aqua   53.31 539.16           2024        1/4 2 aqua   53.31 53.31

## 2024-01-04 NOTE — FLOWSHEET NOTE
help pt return to running. []  []  []      4. Independent with Home Exercise Programs []  []  []        []  []  []      Date Addressed:            LTG: To be met in 16 treatments           1. Improve score on assessment tool Modified Oswestry from 32% impairment to less than 12% impairment, demonstrating improved tolerance to activity to help pt return to running. []  []  []      2. Reduce pain levels to 2/10 or less with activity to help ease ability to return to all running and playing basketball. []  []  []      3. ? Strength: Increase flo hip/core strength to 4+/5 grossly to help improve trunk and LE stability with running and playing basketball. []  []  []                        Patient goals: \"To be able to bend and stand longer than 2-3 minutes at a time.\"        Pt. Education:  [x] Yes  [] No  [] Reviewed Prior HEP/Ed  Method of Education: [x] Verbal  [x] Demo  [] Written  Comprehension of Education:  [x] Verbalizes understanding.  [x] Demonstrates understanding.  [] Needs review.  [] Demonstrates/verbalizes HEP/Ed previously given.     Plan: [x] Continue current frequency toward long and short term goals.    [x] Specific Instructions for subsequent treatments: see above      Time In: 2:04 pm        Time Out: 2:46 pm    Electronically signed by:  Vanessa Talavera PTA

## 2024-01-09 ENCOUNTER — HOSPITAL ENCOUNTER (OUTPATIENT)
Dept: PHYSICAL THERAPY | Facility: CLINIC | Age: 34
Setting detail: THERAPIES SERIES
Discharge: HOME | End: 2024-01-09
Payer: MEDICARE

## 2024-01-09 PROCEDURE — 97110 THERAPEUTIC EXERCISES: CPT

## 2024-01-09 NOTE — FLOWSHEET NOTE
ups, TG squats, tband sidestepping    Treatment Charges: Mins Units   []  Modalities     [x]  Ther Exercise 40 3   []  Manual Therapy     []  Ther Activities     []  Neuro Re-ed     []  Vasocompression     [] Gait     []  Aquatics     Total Treatment time 40 3       Assessment: [x] Progressing toward goals. Initiated session with Nustep for a warm up to promote blood flow prior to ex. Added stretches for hips and lumbar spine, cues to keep within comfortable ROM. Strengthening ex focused on core and hips with fair tolerance, she fatigues quickly with strengthening ex. No increase in pain with progression through treatment only muscle soreness. Issued updated HEP, see below. Will cont to progress as able.     [] No change.     [] Other:     [x] Patient would continue to benefit from skilled physical therapy services in order to: meet goals listed below     Goals  MET NOT MET ON-  GOING  Details   Date Addressed:            STG: To be met in 8 treatments            1. ? Pain: Decrease pain levels to 5/10 with all sitting and standing to ease standing and sitting at work. []  []  []      2. ? ROM: Increase flexibility in flo LEs and low back to help improve lumbar ROM to WNL in all directions with no pain, reducing need for compensations with any bending while at work. []  []  []      3. Improve score on assessment tool Modified Oswestry from 32% impairment to less than 22% impairment, demonstrating improved tolerance to activity to help pt return to running. []  []  []      4. Independent with Home Exercise Programs []  []  []        []  []  []      Date Addressed:            LTG: To be met in 16 treatments           1. Improve score on assessment tool Modified Oswestry from 32% impairment to less than 12% impairment, demonstrating improved tolerance to activity to help pt return to running. []  []  []      2. Reduce pain levels to 2/10 or less with activity to help ease ability to return to all running and playing

## 2024-01-09 NOTE — CARE COORDINATION
Medicare Cap   [x] Physical Therapy  [] Speech Therapy  [] Occupational therapy  *PT and Speech caps combine      $2230 Limit for PT and Speech combined  $2230 Limit for OT individually  At the beginning of the month where you expect to go over $2150, please add the [facility] modifier      Patient Name: Ayaka Coughlin  YOB: 1990    Note:  This is an estimate of charges billed.     Date of [place] Charge Name # units/ charge $$$ charge Daily Total Charge Ongoing Total $$$           2 aqua    54.32 54.32   2 aqua    54.32 108.64   6/8 2 aq   54.32 162.96   6/13 2 aq   54.32 217.28   6/20 2 aq   54.32 271.60   6/29 2 aq   54.32 325.92           12/4/23 2 aqua  30.74+22.57 53.31 379.23   12/4/23 Eval+TE       12/14/23 2 aqua   53.31 432.54   12/22 2 aqua   53.31 485.85   12/28 2 aqua   53.31 539.16           2024 1/4 2 aqua   53.31 53.31   1/9 TE 3  24.77+19.15+19.15 63.07 116.38

## 2024-01-16 ENCOUNTER — HOSPITAL ENCOUNTER (OUTPATIENT)
Dept: PHYSICAL THERAPY | Facility: CLINIC | Age: 34
Setting detail: THERAPIES SERIES
Discharge: HOME | End: 2024-01-16
Payer: MEDICARE

## 2024-01-16 PROCEDURE — 97110 THERAPEUTIC EXERCISES: CPT

## 2024-01-16 NOTE — FLOWSHEET NOTE
[] [facility]  Outpatient Rehabilitation &  Therapy  [address]  [phone]  F: [phone] [] [facility]  Outpatient Rehabilitation &  Therapy  [address]   [address]  P: [phone]  F: [phone] [x] [facility]  Outpatient Rehabilitation &  Therapy  [address]  P: [phone]  F: [phone] [] [facility]  Outpatient Rehabilitation &  Therapy  [address]  P: [phone]  F: [phone] [] [facility]  Outpatient Rehabilitation &  Therapy  [address]   [address]   P: [phone]  F: [phone]      Physical Therapy Daily Treatment Note    Date:  2024  Patient Name:  Ayaka Coughlin    :  1990  MRN: [id number]  Physician: Tee XAVIER                                  Insurance: Medicare  Medical Diagnosis: chronic LBP                  Rehab Codes: M54.5  Onset Date: 23             Next 's appt.: TBD    Visit# / total visits: ; Progress note for Medicare patient due at visit 10     Cancels/No Shows:     Subjective:    Pain:  [] Yes  [x] No Location: LB/B knees Pain Rating: (0-10 scale) 0/10  Pain altered Tx:  [x] No  [] Yes  Action:  Comments: Pt reports no pain in her back at arrival, some discomfort down LLE. Mentions her feet cont to bother her and her legs feel heavy.     Objective:  Modalities: none  Precautions: standard  Exercises:  Exercise Reps/ Time Weight/ Level Comments              Nustep 10' L4      SB stretch 3x30\"  L3     HS stretch stool 3x30\"     Stool hip flexor stretch -  No stretch felt    3 way hip  2x10 Orange loop    Mini squats 2x10           LTR 10x10\"     Piriformis Stretch 3x30\"     TA marches x20     Bridges  x10       Clamshells x10     SL hip abduction x10     Prone hip ext - knee bent and straight X10 ea  Pillow under hips             Other:     Specific Instructions for next treatment: Progress hip and core strength.

## 2024-01-23 ENCOUNTER — HOSPITAL ENCOUNTER (OUTPATIENT)
Dept: PHYSICAL THERAPY | Facility: CLINIC | Age: 34
Setting detail: THERAPIES SERIES
Discharge: HOME | End: 2024-01-23
Payer: MEDICARE

## 2024-01-23 PROCEDURE — 97110 THERAPEUTIC EXERCISES: CPT

## 2024-01-23 NOTE — FLOWSHEET NOTE
[] [facility]  Outpatient Rehabilitation &  Therapy  [address]  [phone]  F: [phone] [] [facility]  Outpatient Rehabilitation &  Therapy  [address]   [address]  P: [phone]  F: [phone] [x] [facility]  Outpatient Rehabilitation &  Therapy  [address]  P: [phone]  F: [phone] [] [facility]  Outpatient Rehabilitation &  Therapy  [address]  P: [phone]  F: [phone] [] [facility]  Outpatient Rehabilitation &  Therapy  [address]   [address]   P: [phone]  F: [phone]      Physical Therapy Daily Treatment Note    Date:  2024  Patient Name:  Ayaka Coughlin    :  1990  MRN: [id number]  Physician: Tee XAVIER                                  Insurance: Medicare  Medical Diagnosis: chronic LBP                  Rehab Codes: M54.5  Onset Date: 23             Next 's appt.: TBD    Visit# / total visits: ; Progress note for Medicare patient due at visit 10     Cancels/No Shows:     Subjective:    Pain:  [] Yes  [x] No Location: LB/B knees Pain Rating: (0-10 scale) 0/10  Pain altered Tx:  [x] No  [] Yes  Action:  Comments: Pt reports her work is sporadically busy. Things were busier recently with her back being more bothersome today. Takes gabapentin and mobec prior to shift, feels they wear off towards the end of her shift. Goes home and lays on her back where she feels like pain increases. (L) ankle cont to bother her with sharp pain.     Objective:  Modalities: none  Precautions: standard  Exercises:  Exercise Reps/ Time Weight/ Level Comments               Nustep 10' L5   x   SB stretch 3x30\" L3   x   HS stretch stool 3x30\"   x   Supine figure 4 S 3x30\"   x   Trent S 2x30\"   x          3 way hip  2x10 Orange loop     Mini squats 2x10   -   TG squats  x20 L15  x   Step ups  x10 ea 6\"  x   Tband monster laps 3L      [place]

## 2024-01-25 ENCOUNTER — APPOINTMENT (OUTPATIENT)
Dept: PHYSICAL THERAPY | Facility: CLINIC | Age: 34
End: 2024-01-25
Payer: MEDICARE

## 2024-01-29 ENCOUNTER — HOSPITAL ENCOUNTER (OUTPATIENT)
Age: 34
Setting detail: SPECIMEN
Discharge: HOME | End: 2024-01-29

## 2024-01-29 ENCOUNTER — OFFICE VISIT (OUTPATIENT)
Dept: PRIMARY CARE CLINIC | Age: 34
End: 2024-01-29
Payer: MEDICARE

## 2024-01-29 VITALS
HEIGHT: 69 IN | DIASTOLIC BLOOD PRESSURE: 80 MMHG | HEART RATE: 79 BPM | BODY MASS INDEX: 38.98 KG/M2 | WEIGHT: 263.2 LBS | SYSTOLIC BLOOD PRESSURE: 110 MMHG | OXYGEN SATURATION: 97 %

## 2024-01-29 DIAGNOSIS — Z01.419 ENCOUNTER FOR GYNECOLOGICAL EXAMINATION: Primary | ICD-10-CM

## 2024-01-29 DIAGNOSIS — Z12.4 CERVICAL CANCER SCREENING: ICD-10-CM

## 2024-01-29 DIAGNOSIS — D48.5 NEOPLASM OF UNCERTAIN BEHAVIOR OF SKIN: ICD-10-CM

## 2024-01-29 PROCEDURE — G0101 CA SCREEN;PELVIC/BREAST EXAM: HCPCS | Performed by: PHYSICIAN ASSISTANT

## 2024-01-29 PROCEDURE — G8484 FLU IMMUNIZE NO ADMIN: HCPCS | Performed by: PHYSICIAN ASSISTANT

## 2024-01-29 ASSESSMENT — PATIENT HEALTH QUESTIONNAIRE - PHQ9
2. FEELING DOWN, DEPRESSED OR HOPELESS: 0
SUM OF ALL RESPONSES TO PHQ QUESTIONS 1-9: 0
SUM OF ALL RESPONSES TO PHQ QUESTIONS 1-9: 0
SUM OF ALL RESPONSES TO PHQ9 QUESTIONS 1 & 2: 0
SUM OF ALL RESPONSES TO PHQ QUESTIONS 1-9: 0
SUM OF ALL RESPONSES TO PHQ QUESTIONS 1-9: 0
1. LITTLE INTEREST OR PLEASURE IN DOING THINGS: 0

## 2024-01-29 NOTE — PROGRESS NOTES
[facility]  [address]  [place] [address]  Phone: [phone]  Fax: [phone]    Ayaka Coughlin is a 33 y.o. female who presents today for her medicalconditions/complaints as noted below.    Chief Complaint   Patient presents with    Gynecologic Exam     Pt is here for a pap -- this is the first one in the [facility] last pap was normal     Pt has no concerns         Allergies   Allergen Reactions    Seasonal      Current Outpatient Medications   Medication Sig Dispense Refill    meloxicam (MOBIC) 15 MG tablet TAKE 1 TABLET BY MOUTH ONCE DAILY AS NEEDED FOR PAIN 30 tablet 1    FLUoxetine (PROZAC) 20 MG capsule Take 1 capsule by mouth daily 90 capsule 1     No current facility-administered medications for this visit.     Past Medical History:   Diagnosis Date    ADHD (attention deficit hyperactivity disorder)     Bipolar disorder (HCC)     Chronic back pain     Depression     Learning disability      Social History     Socioeconomic History    Marital status: Single     Spouse name: Not on file    Number of children: Not on file    Years of education: Not on file    Highest education level: Not on file   Occupational History    Not on file   Tobacco Use    Smoking status: Every Day     Current packs/day: 1.00     Types: E-Cigarettes, Cigarettes    Smokeless tobacco: Never   Vaping Use    Vaping Use: Every day   Substance and Sexual Activity    Alcohol use: Yes     Comment: ocassionally     Drug use: Yes     Frequency: 1.0 times per week     Types: Marijuana (Weed)    Sexual activity: Not Currently   Other Topics Concern    Not on file   Social History Narrative    Not on file     Social Determinants of Health     Financial Resource Strain: Low Risk  (4/25/2023)    Overall Financial Resource Strain (CARDIA)     Difficulty of Paying Living Expenses: Not hard at all   Food Insecurity: Not on file (4/25/2023)   Transportation Needs: Unmet Transportation Needs

## 2024-01-31 ENCOUNTER — HOSPITAL ENCOUNTER (OUTPATIENT)
Dept: PHYSICAL THERAPY | Facility: CLINIC | Age: 34
Setting detail: THERAPIES SERIES
Discharge: HOME | End: 2024-01-31
Payer: MEDICARE

## 2024-01-31 PROCEDURE — 97110 THERAPEUTIC EXERCISES: CPT

## 2024-01-31 NOTE — PROGRESS NOTES
reps      Comprehension of Education:  [x] Verbalizes understanding.  [x] Demonstrates understanding.  [] Needs review.  [] Demonstrates/verbalizes HEP/Ed previously given.     Plan: [x] Continue current frequency toward long and short term goals.  She is to have injections into her LB next Monday, so she won't be allowed PT for 2 days and due to her work schedule, she can only attend on Mon and Tues. So she is scheduled 1x/wk for the remainder of Feb.  She was instructed to follow up with Tee GILBERT for her L ankle.    [x] Specific Instructions for subsequent treatments: see above      Time In: 1105    Time Out: 1152    Electronically signed by:  Shailesh Espino, PT

## 2024-02-02 ENCOUNTER — HOSPITAL ENCOUNTER (OUTPATIENT)
Dept: PHYSICAL THERAPY | Facility: CLINIC | Age: 34
Setting detail: THERAPIES SERIES
Discharge: HOME | End: 2024-02-02

## 2024-02-02 NOTE — FLOWSHEET NOTE
[] [facility]  Outpatient Rehabilitation &  Therapy  [address]    [phone]  F: [phone]   [] [facility]  Outpatient Rehabilitation &  Therapy  [address]  P: [phone]  F: [phone]  [] [facility]  Outpatient Rehabilitation &  Therapy  [address]  P: [phone]  F: [phone] [] [facility]  Outpatient Rehabilitation &  Therapy  [address]  P: [phone]  F: [phone]  [] [facility]  Outpatient Rehabilitation &  Therapy  [address]   P: [phone]  F: [phone]   [] [facility]   Outpatient Rehabilitation & Therapy  [address]  P: [phone]   F: [phone]     Physical Therapy Cancel/No Show note    Date: 2024  Patient: Ayaka Coughlin  : 1990  MRN: [id number]    Cancels/No Shows to date:     For today's appointment patient:    [x]  Cancelled    [] Rescheduled appointment    [] No-show     Reason given by patient:    []  Patient ill    []  Conflicting appointment    [] No transportation      [] Conflict with work    [] No reason given    [] Weather related    [] COVID-19    [x] Other:      Comments:  patient is still sore from previous PT      [x] Next appointment was confirmed    Electronically signed by: Mayra Schmidt

## 2024-02-15 DIAGNOSIS — G89.29 CHRONIC MIDLINE LOW BACK PAIN WITHOUT SCIATICA: ICD-10-CM

## 2024-02-15 DIAGNOSIS — M54.50 CHRONIC MIDLINE LOW BACK PAIN WITHOUT SCIATICA: ICD-10-CM

## 2024-02-15 RX ORDER — MELOXICAM 15 MG/1
TABLET ORAL
Qty: 30 TABLET | Refills: 0 | Status: SHIPPED | OUTPATIENT
Start: 2024-02-15

## 2024-02-15 NOTE — TELEPHONE ENCOUNTER
LAST VISIT:   1/29/2024     Future Appointments   Date Time Provider Department Center   2/19/2024  2:00 PM Catarina Randa, PTA [facility] [place]   2/20/2024  1:30 PM Tee Turner PA [facility]   2/26/2024  2:00 PM Eber Samuel, PTA [facility] [place]   3/8/2024 10:00 AM Saritha Mary PA-C [facility]       Pharmacy verified? Yes    [facility] [address] [place] [address] [address] [address] [facility] [phone] -  [phone]  [address] [facility] [address]  Phone: [phone] Fax: [phone]

## 2024-02-19 ENCOUNTER — HOSPITAL ENCOUNTER (OUTPATIENT)
Dept: PHYSICAL THERAPY | Facility: CLINIC | Age: 34
Setting detail: THERAPIES SERIES
Discharge: HOME | End: 2024-02-19
Payer: MEDICARE

## 2024-02-19 PROCEDURE — 97110 THERAPEUTIC EXERCISES: CPT

## 2024-02-19 NOTE — FLOWSHEET NOTE
[] [facility]  Outpatient Rehabilitation &  Therapy  [address]  [phone]  F: [phone] [] [facility]  Outpatient Rehabilitation &  Therapy  [address]   [address]  P: [phone]  F: [phone] [x] [facility]  Outpatient Rehabilitation &  Therapy  [address]  P: [phone]  F: [phone] [] [facility]  Outpatient Rehabilitation &  Therapy  [address]  P: [phone]  F: [phone] [] [facility]  Outpatient Rehabilitation &  Therapy  [address]   [address]   P: [phone]  F: [phone]      Physical Therapy Daily Treatment    Date:  2024  Patient Name:  Ayaka Coughlin    :  1990  MRN: [id number]  Physician: Tee XAVIER                      Insurance: Medicare  Medical Diagnosis: chronic LBP                  Rehab Codes: M54.5  Onset Date: 23               Next 's appt.: [place]  (pain mgmt)  Visit# / total visits: 10/12; Progress note for Medicare patient due at visit 19     Cancels/No Shows:     Subjective:    Pain:  [x] Yes  [] No Location: LB/B knees Pain Rating: (0-10 scale) 5/10  Pain altered Tx:  [x] No  [] Yes  Action:  Comments: Pt arrives after hiatus, has not returned to PT as she was initially very sore after last session. Reports she is now sore from not doing her HEP or attending PT since. States she has an appt tomorrow with Dr. Turner about her L Achilles pain and onset of R foot pain. Has not scheduled to have any LB injections at this time.     MRI in December revealed IMPRESSION:  Degenerative change in the lower lumbar spine with canal stenosis at L4-5 and L5-S1.     Foraminal narrowing as described above.    Objective:  Modalities: none  Precautions: standard  Exercises:  Exercise Reps/ Time Weight/ Level Comments    Iconfinderfit bike 5'  3.0   X          SB stretch 3x30\" L3   X   Supine figure 4 S 3x30\"   X   [employer] S 2x30\"

## 2024-02-20 ENCOUNTER — OFFICE VISIT (OUTPATIENT)
Dept: PRIMARY CARE CLINIC | Age: 34
End: 2024-02-20

## 2024-02-20 VITALS
WEIGHT: 269.2 LBS | BODY MASS INDEX: 40.8 KG/M2 | SYSTOLIC BLOOD PRESSURE: 128 MMHG | DIASTOLIC BLOOD PRESSURE: 88 MMHG | HEIGHT: 68 IN | OXYGEN SATURATION: 99 % | HEART RATE: 85 BPM

## 2024-02-20 DIAGNOSIS — M79.671 FOOT PAIN, BILATERAL: ICD-10-CM

## 2024-02-20 DIAGNOSIS — G62.9 NEUROPATHY: ICD-10-CM

## 2024-02-20 DIAGNOSIS — M46.1 SACROILIITIS (HCC): ICD-10-CM

## 2024-02-20 DIAGNOSIS — Z72.0 TOBACCO ABUSE: ICD-10-CM

## 2024-02-20 DIAGNOSIS — E66.01 SEVERE OBESITY (BMI 35.0-39.9) WITH COMORBIDITY (HCC): ICD-10-CM

## 2024-02-20 DIAGNOSIS — G47.00 INSOMNIA, UNSPECIFIED TYPE: ICD-10-CM

## 2024-02-20 DIAGNOSIS — M79.672 FOOT PAIN, BILATERAL: ICD-10-CM

## 2024-02-20 DIAGNOSIS — F81.9 LEARNING DISABILITY: ICD-10-CM

## 2024-02-20 DIAGNOSIS — Z00.00 INITIAL MEDICARE ANNUAL WELLNESS VISIT: Primary | ICD-10-CM

## 2024-02-20 RX ORDER — GABAPENTIN 300 MG/1
300 CAPSULE ORAL 3 TIMES DAILY
COMMUNITY

## 2024-02-20 RX ORDER — PREDNISONE 20 MG/1
20 TABLET ORAL 2 TIMES DAILY
Qty: 10 TABLET | Refills: 0 | Status: SHIPPED | OUTPATIENT
Start: 2024-02-20 | End: 2024-02-25

## 2024-02-20 RX ORDER — TRAZODONE HYDROCHLORIDE 50 MG/1
50 TABLET ORAL NIGHTLY
Qty: 30 TABLET | Refills: 0 | Status: SHIPPED | OUTPATIENT
Start: 2024-02-20

## 2024-02-20 ASSESSMENT — PATIENT HEALTH QUESTIONNAIRE - PHQ9
SUM OF ALL RESPONSES TO PHQ QUESTIONS 1-9: 0
2. FEELING DOWN, DEPRESSED OR HOPELESS: 0
1. LITTLE INTEREST OR PLEASURE IN DOING THINGS: 0
SUM OF ALL RESPONSES TO PHQ QUESTIONS 1-9: 0
SUM OF ALL RESPONSES TO PHQ9 QUESTIONS 1 & 2: 0

## 2024-02-20 ASSESSMENT — LIFESTYLE VARIABLES
HOW OFTEN DO YOU HAVE A DRINK CONTAINING ALCOHOL: MONTHLY OR LESS
HOW MANY STANDARD DRINKS CONTAINING ALCOHOL DO YOU HAVE ON A TYPICAL DAY: 1 OR 2

## 2024-02-20 NOTE — PROGRESS NOTES
steps/day  Obesity Interventions:  low carbohydrate diet, exercise for at least 150 minutes/week, wear a pedometer and walk at least 10,000 steps/day    Obesity Counseling: Patient was asked about her current diet and exercise habits, and personalized advice was provided regarding recommended lifestyle changes. Patient's comorbid health conditions associated with elevated BMI were discussed, as well as the likely benefits of weight loss. Based upon patient's motivation to change her behavior, the following plan was agreed upon to work toward a weight loss goal of 40 pounds: lower carbohydrate diet, at least 150 minutes of exercise/week, and increase physical activity, as tolerated. Educational materials for weight loss were provided.  Patient will follow-up in 6 month(s) with PCP. Provider spent 15 minutes counseling patient.        Dentist Screen:  Have you seen the dentist within the past year?: (!) No    Intervention:  Advised to schedule with their dentist       ADL's:   Patient reports needing help with:  Select all that apply: (!) Transportation  Interventions:  SDOH referral.     Advanced Directives:  Do you have a Living Will?: (!) No    Intervention:  has NO advanced directive - not interested in additional information        Tobacco Use:  Tobacco Use: High Risk (1/29/2024)    Patient History     Smoking Tobacco Use: Every Day     Smokeless Tobacco Use: Never     Passive Exposure: Not on file     E-cigarette/Vaping       Questions Responses    E-cigarette/Vaping Use Current Every Day User    Start Date     Passive Exposure     Quit Date     Counseling Given     Comments         Interventions:  Educated to stop smoking.     Tobacco Use Counseling: Patient was counseled on tobacco cessation. Based upon patient's motivation to change her behavior, the following plan was agreed upon: gradual reduction of tobacco use, willpower, and Telling people she is trying to quit.  . Educational materials regarding tobacco

## 2024-02-22 ENCOUNTER — CARE COORDINATION (OUTPATIENT)
Dept: CARE COORDINATION | Age: 34
End: 2024-02-22

## 2024-02-23 DIAGNOSIS — F41.9 ANXIETY: ICD-10-CM

## 2024-02-23 NOTE — CARE COORDINATION
Initial Contact Social Work Note - Ambulatory  2/23/2024      Date of referral: 2/21/24  Referral received from: STELLA Tracey  Reason for referral: housing/transportation     Previous SW referral: No  If yes, brief summary of outcome: n/a    Two Identifiers Verified: Yes    Insurance Provider: Medicare/Medicaid     Support System:  Parent    [place] Status:  n/a    Community Providers: SNAP/Food [place]    ADL Assistance Needed: Dressing and N/A    Housing/Living Concerns or Home Modification Needs: lives in a hotel.  Looking for apartment.     Transportation Concern: yes. Ayaka and her dad are working on getting their license.     Medication Cost Concern: no     Medication Adherence Concern: no    Financial Concern(s): no    Income (only if applicable): Ayaka receives Frequency and works at [employer]. Ayaka's dad works 2 jobs.    Ability to Read/Write: Yes    Advance Care Plan:  N/A    Other: None.        Method of Communication With Provider (if appropriate): N/a       spoke with Ayaka.    Ayaka and her dad live at a hotel and are saving for an apartment.     Ayaka receives social security and works at [employer].  Ayaka's dad does house keeping and works at [employer].    Ayaka reports that she and her dad are working on getting their license.  Ayaka reports that they have a car and needs a tune up.    Ayaka reports that her dad receives food stamps.    Ayaka denied the need for food pantries and stated they use 3 local ones already monthly.     Ayaka uses her Medicaid cab to appointments.     Ayaka reports that she has an appointment with [facility] beginning of March.     Denied questions or concerns.     Plan:    will follow up with Ayaka in 3 weeks to confirm she is service connected with [place].

## 2024-02-26 ENCOUNTER — HOSPITAL ENCOUNTER (OUTPATIENT)
Dept: PHYSICAL THERAPY | Facility: CLINIC | Age: 34
Setting detail: THERAPIES SERIES
Discharge: HOME | End: 2024-02-26
Payer: MEDICARE

## 2024-02-26 PROCEDURE — 97110 THERAPEUTIC EXERCISES: CPT

## 2024-02-26 RX ORDER — FLUOXETINE HYDROCHLORIDE 20 MG/1
20 CAPSULE ORAL DAILY
Qty: 90 CAPSULE | Refills: 0 | Status: SHIPPED | OUTPATIENT
Start: 2024-02-26

## 2024-02-26 NOTE — FLOWSHEET NOTE
[] [facility]  Outpatient Rehabilitation &  Therapy  [address]  [phone]  F: [phone] [] [facility]  Outpatient Rehabilitation &  Therapy  [address]   [address]  P: [phone]  F: [phone] [x] [facility]  Outpatient Rehabilitation &  Therapy  [address]  P: [phone]  F: [phone] [] [facility]  Outpatient Rehabilitation &  Therapy  [address]  P: [phone]  F: [phone] [] [facility]  Outpatient Rehabilitation &  Therapy  [address]   [address]   P: [phone]  F: [phone]      Physical Therapy Daily Treatment    Date:  2024  Patient Name:  Ayaka Coughlin    :  1990  MRN: [id number]  Physician: Tee XAVIER                      Insurance: Medicare  Medical Diagnosis: chronic LBP                  Rehab Codes: M54.5  Onset Date: 23               Next 's appt.: [place]  (pain mgmt)  Visit# / total visits: ; Progress note for Medicare patient due at visit 19     Cancels/No Shows:     Subjective: pt mentions that her knees were bothering her a little more following her previous visit and resuming PT. Her back felt fine.    Pain:  [x] Yes  [] No Location: LB/B knees Pain Rating: (0-10 scale) 5/10  Pain altered Tx:  [x] No  [] Yes  Action:  Comments:     MRI in December revealed IMPRESSION:  Degenerative change in the lower lumbar spine with canal stenosis at L4-5 and L5-S1.     Foraminal narrowing as described above.    Objective:      Today's Treatment:  Modalities: none  Precautions: standard  Exercise Reps/ Time Weight/ Level Comments    Scifit bike 5'  3.0   X          SB stretch 3x30\" L3   X   Supine figure 4 S 3x30\"   X   Trent S 2x30\"             4 way hip  2x10 lime Jermaine; issued lime X   chair squats 10  W/ use of UEs *   TG squats 2 legged x20 L20  X   Step ups  x10 ea 6\" Post and lateral x   Tband monster laps 2L  lime

## 2024-02-28 LAB — CYTOLOGY REPORT: NORMAL

## 2024-03-04 ENCOUNTER — HOSPITAL ENCOUNTER (OUTPATIENT)
Dept: PHYSICAL THERAPY | Facility: CLINIC | Age: 34
Setting detail: THERAPIES SERIES
Discharge: HOME | End: 2024-03-04
Payer: MEDICARE

## 2024-03-04 PROCEDURE — 97110 THERAPEUTIC EXERCISES: CPT

## 2024-03-04 NOTE — FLOWSHEET NOTE
[x] [facility]  Outpatient Rehabilitation &  Therapy  [address]  P: [phone]  F: [phone]     Physical Therapy Daily Treatment    Date:  3/4/2024  Patient Name:  Ayaka Coughlin    :  1990  MRN: [id number]  Physician: Tee XAVIER                      Insurance: Medicare  Medical Diagnosis: chronic LBP                  Rehab Codes: M54.5  Onset Date: 23               Next 's appt.: [place]  (pain mgmt)  Visit# / total visits: ; Progress note for Medicare patient due at visit 19     Cancels/No Shows:     Subjective: States that her back only hurts when she is laying on her stomach and tries to get up.  States that BLE knees are doing better.  Reports that she is not compliant with HEP. Decreased pain by end of session. 1/10.  Pain:  [x] Yes  [] No Location: between shoulder blades, LB/B knees Pain Rating: (0-10 scale) 4/10  Pain altered Tx:  [x] No  [] Yes  Action:  Comments:     MRI in December revealed IMPRESSION:  Degenerative change in the lower lumbar spine with canal stenosis at L4-5 and L5-S1.     Foraminal narrowing as described above.    Objective:      Today's Treatment:  Modalities: none  Precautions: standard  Exercise Reps/ Time Weight/ Level Comments    Scifit bike 5'  3.0   X          SB stretch 3x30\" L3      Supine figure 4 S 3x30\"      Trent S 2x30\"             4 way hip  2x10 lime Jermaine; issued lime x   chair squats 10  W/ use of UEs x   TG squats 2 legged x20 L20     Step ups  X15 ea 6\" Post and lateral x   Tband monster laps 2L  lime  x   Palloff press 2x10 ea Red  X          Bridges  2x10 lime   x   Clamshells 2 x 10 ea lime  x   SL hip abduction 10 x 2 lime  x     Other:     Specific Instructions for next treatment: Progress hip and core strength.       Treatment Charges: Mins Units   []  Modalities     [x]  Ther Exercise 53 4   []  Manual Therapy     []  Ther Activities     []  Neuro Re-ed     []  Vasocompression     [] Gait

## 2024-03-07 ENCOUNTER — CARE COORDINATION (OUTPATIENT)
Dept: CARE COORDINATION | Age: 34
End: 2024-03-07

## 2024-03-08 ENCOUNTER — PROCEDURE VISIT (OUTPATIENT)
Dept: PRIMARY CARE CLINIC | Age: 34
End: 2024-03-08

## 2024-03-08 VITALS
DIASTOLIC BLOOD PRESSURE: 74 MMHG | OXYGEN SATURATION: 98 % | HEIGHT: 68 IN | BODY MASS INDEX: 40.01 KG/M2 | HEART RATE: 79 BPM | SYSTOLIC BLOOD PRESSURE: 110 MMHG | WEIGHT: 264 LBS

## 2024-03-08 DIAGNOSIS — M54.50 CHRONIC MIDLINE LOW BACK PAIN WITHOUT SCIATICA: ICD-10-CM

## 2024-03-08 DIAGNOSIS — D48.5 NEOPLASM OF UNCERTAIN BEHAVIOR OF SKIN: Primary | ICD-10-CM

## 2024-03-08 DIAGNOSIS — G89.29 CHRONIC MIDLINE LOW BACK PAIN WITHOUT SCIATICA: ICD-10-CM

## 2024-03-08 RX ORDER — MELOXICAM 15 MG/1
15 TABLET ORAL DAILY
Qty: 30 TABLET | Refills: 3 | Status: SHIPPED | OUTPATIENT
Start: 2024-03-08

## 2024-03-08 NOTE — PROGRESS NOTES
Skin Biopsy Procedure Note  3/8/2024  Ayaka Coughlin  1990    /74   Pulse 79   Ht 1.727 m (5' 8\")   Wt 119.7 kg (264 lb)   SpO2 98%   BMI 40.14 kg/m²   VITALS REVIEWED    Allergies   Allergen Reactions    Seasonal        Chief Complaint   Patient presents with    Procedure     Pt is here for 1x punch biopsy        Lesion:  1. Upper abdomen        Indication for Biopsy 1: dark, irregularly shaped nevus      Procedure:  The lesion(s) was cleansed with Alcohol.  2% lidocaine with epinephrine was used for local anaesthesia.  A 6 mm  punch was used to obtain a specimen.    Wound closed with  3, 4-0 nylon sutures. The specimen(s) was    preserved and sent for pathological examination.  The biopsy site(s) was  cleansed and hemostasis using  and a pressure dressing(s) was achieved with good results.  The patient was instructed on wound care.  No complications occurred and the patient tolerated the procedure well.      Diagnosis Orders   1. Neoplasm of uncertain behavior of skin  Derm biopsy      2. Chronic midline low back pain without sciatica  meloxicam (MOBIC) 15 MG tablet          Follow Up:  Wound care discussed.  Keep well moisturized.  Watch for signs of infection which would include:  Redness, swelling, fever.  If signs appear, please return to office.  Return in about 10 days (around 3/18/2024) for Suture removal.       Electronically signed by Saritha Mary PA-C on 3/8/2024 at 10:50 AM

## 2024-03-08 NOTE — CARE COORDINATION
attempted to contact Ayaka.   left message with name and number.   requested a call back to [phone].    Plan:    will attempt to follow up with Ayaka in 1 week.

## 2024-03-10 ENCOUNTER — NURSE ONLY (OUTPATIENT)
Dept: LAB | Age: 34
End: 2024-03-10

## 2024-03-11 ENCOUNTER — APPOINTMENT (OUTPATIENT)
Dept: PHYSICAL THERAPY | Facility: CLINIC | Age: 34
End: 2024-03-11
Payer: COMMERCIAL

## 2024-03-18 ENCOUNTER — HOSPITAL ENCOUNTER (OUTPATIENT)
Dept: PHYSICAL THERAPY | Facility: CLINIC | Age: 34
Setting detail: THERAPIES SERIES
Discharge: HOME | End: 2024-03-18
Payer: MEDICARE

## 2024-03-18 NOTE — FLOWSHEET NOTE
[] [facility]  Outpatient Rehabilitation &  Therapy  [address]    [phone]  F: [phone]   [] [facility]  Outpatient Rehabilitation &  Therapy  [address]   [address]  P: [phone]  F: [phone]  [x] [facility]  Outpatient Rehabilitation &  Therapy  [address]  P: [phone]  F: [phone] [] [facility]  Outpatient Rehabilitation &  Therapy  [address]  P: [phone]  F: [phone]  [] [facility]  Outpatient Rehabilitation &  Therapy  [address]   P: [phone]  F: [phone]   [] [facility]   Outpatient Rehabilitation & Therapy  [address]  P: [phone]   F: [phone]     Physical Therapy Cancel/No Show note    Date: 3/18/2024  Patient: Ayaka Coughlin  : 1990  MRN: [id number]    Cancels/No Shows to date: 3/1    For today's appointment patient:    [x]  Cancelled    [x] Rescheduled appointment    [] No-show     Reason given by patient:    []  Patient ill    []  Conflicting appointment    [] No transportation      [] Conflict with work    [x] No reason given    [] Weather related    [] COVID-19    [x] Other:      Comments:  Pt rescheduled to 3/26      [x] Next appointment was confirmed    Electronically signed by: Randa Elmore PTA

## 2024-03-19 ENCOUNTER — CARE COORDINATION (OUTPATIENT)
Dept: CARE COORDINATION | Age: 34
End: 2024-03-19

## 2024-03-19 ENCOUNTER — OFFICE VISIT (OUTPATIENT)
Dept: PODIATRY | Age: 34
End: 2024-03-19
Payer: MEDICARE

## 2024-03-19 VITALS — WEIGHT: 264 LBS | BODY MASS INDEX: 40.01 KG/M2 | HEIGHT: 68 IN

## 2024-03-19 DIAGNOSIS — M79.672 PAIN IN LEFT FOOT: ICD-10-CM

## 2024-03-19 DIAGNOSIS — G47.00 INSOMNIA, UNSPECIFIED TYPE: ICD-10-CM

## 2024-03-19 DIAGNOSIS — M77.8 ENTHESOPATHY OF LEFT FOOT: ICD-10-CM

## 2024-03-19 DIAGNOSIS — M76.62 ACHILLES TENDINITIS, LEFT LEG: Primary | ICD-10-CM

## 2024-03-19 DIAGNOSIS — M24.572 EQUINUS CONTRACTURE OF LEFT ANKLE: ICD-10-CM

## 2024-03-19 PROCEDURE — 99203 OFFICE O/P NEW LOW 30 MIN: CPT | Performed by: PODIATRIST

## 2024-03-19 PROCEDURE — G8427 DOCREV CUR MEDS BY ELIG CLIN: HCPCS | Performed by: PODIATRIST

## 2024-03-19 PROCEDURE — G8417 CALC BMI ABV UP PARAM F/U: HCPCS | Performed by: PODIATRIST

## 2024-03-19 PROCEDURE — 4004F PT TOBACCO SCREEN RCVD TLK: CPT | Performed by: PODIATRIST

## 2024-03-19 PROCEDURE — G8484 FLU IMMUNIZE NO ADMIN: HCPCS | Performed by: PODIATRIST

## 2024-03-19 RX ORDER — TRAZODONE HYDROCHLORIDE 50 MG/1
50 TABLET ORAL NIGHTLY
Qty: 30 TABLET | Refills: 0 | Status: SHIPPED | OUTPATIENT
Start: 2024-03-19

## 2024-03-19 ASSESSMENT — ENCOUNTER SYMPTOMS
COLOR CHANGE: 0
SHORTNESS OF BREATH: 0
BACK PAIN: 0

## 2024-03-19 NOTE — CARE COORDINATION
called and spoke to Ayaka.  Ayaka reports that things are going well.     Ayaka confirmed she is still working at [employer] and collecting disability.    Ayaka reports that her dad did complete the paper test for his  license and now will need to take the actual driving test.      Ayaka stated that they are still looking to move out of the hotel.  Ayaka reports they are hoping to stay in the area where they can walk to everything.      Ayaka denied questions or concerns.    Ayaka requesting  to call back in a few weeks to check back in.   was agreeable.     Plan:    will follow up with Ayaka in 2 weeks.   Ayaka and her dad will continue to look for an apartment.

## 2024-03-20 ENCOUNTER — NURSE ONLY (OUTPATIENT)
Dept: PRIMARY CARE CLINIC | Age: 34
End: 2024-03-20

## 2024-03-20 VITALS — SYSTOLIC BLOOD PRESSURE: 122 MMHG | HEART RATE: 83 BPM | OXYGEN SATURATION: 98 % | DIASTOLIC BLOOD PRESSURE: 80 MMHG

## 2024-03-26 ENCOUNTER — HOSPITAL ENCOUNTER (OUTPATIENT)
Dept: PHYSICAL THERAPY | Facility: CLINIC | Age: 34
Setting detail: THERAPIES SERIES
Discharge: HOME | End: 2024-03-26
Payer: MEDICARE

## 2024-03-26 PROCEDURE — 97110 THERAPEUTIC EXERCISES: CPT

## 2024-03-26 NOTE — FLOWSHEET NOTE
[x] [facility]  Outpatient Rehabilitation &  Therapy  [address]  P: [phone]  F: [phone]     Physical Therapy Daily Treatment    Date:  3/26/2024  Patient Name:  Ayaka Coughlin    :  1990  MRN: [id number]  Physician: Tee XAVIER                      Insurance: Medicare  Medical Diagnosis: chronic LBP                  Rehab Codes: M54.5  Onset Date: 23               Next 's appt.: -  Visit# / total visits: 13/    Cancels/No Shows:     Subjective:   Pain:  [x] Yes  [] No Location: between shoulder blades, LB/B knees Pain Rating: (0-10 scale) 0/10 LB, 8/10 UB  Pain altered Tx:  [x] No  [] Yes  Action:  Comments: Pt reporting no pain in her lower back however high pain in her upper back. Received a script from her podiatrist for B feet.     MRI in December revealed IMPRESSION:  Degenerative change in the lower lumbar spine with canal stenosis at L4-5 and L5-S1.     Foraminal narrowing as described above.    Objective:      Today's Treatment:  Modalities: none  Precautions: standard  Exercise Reps/ Time Weight/ Level Comments    Scifit bike 5'  3.0   X          SB stretch 3x30\" L3   x   Supine piriformis 3x30\"   x   Trent S 2x30\"      Seated lumbar flex stretch 5x10\"   x          4 way hip  2x10 lime Jermaine; issued lime    chair squats 10  W/ use of UEs x   TG squats 2 legged x20 L20     Step ups  X15 ea 6\" Post and lateral    Tband monster laps 2L  lime     Palloff press 2x10 ea Lime tband  X          Bridges  2x10 lime   x   Clamshells 2x10 lime  x   SL hip abduction 2x10 lime  x     Other:     Specific Instructions for next treatment: Progress hip and core strength.     Treatment Charges: Mins Units   []  Modalities     [x]  Ther Exercise 45 3   []  Manual Therapy     []  Ther Activities     []  Neuro Re-ed     []  Vasocompression     [] Gait     []  Aquatics     Total Treatment time 45 3       Assessment: [x] Progressing toward goals. Reviewed and updated

## 2024-04-08 ENCOUNTER — HOSPITAL ENCOUNTER (OUTPATIENT)
Dept: PHYSICAL THERAPY | Facility: CLINIC | Age: 34
Setting detail: THERAPIES SERIES
Discharge: HOME | End: 2024-04-08
Attending: PODIATRIST
Payer: COMMERCIAL

## 2024-04-08 PROCEDURE — 97016 VASOPNEUMATIC DEVICE THERAPY: CPT

## 2024-04-08 PROCEDURE — 97110 THERAPEUTIC EXERCISES: CPT

## 2024-04-08 PROCEDURE — 97161 PT EVAL LOW COMPLEX 20 MIN: CPT

## 2024-04-08 NOTE — CONSULTS
Scoliosis [] [] []    Iliac Crest [] [] []    PSIS [] [] []    ASIS [] [] []    Genu Valgus [] [] []    Genu Varus [] [] []    Genu Recurvatum [] [] []    Pronation [] [] []    Supination [] [] []    Leg Length Discrp [] [] []    Slumped Sitting [] [] []    Palpation [] [x] [] L calf, AT insertion   Sensation [] [] []    Edema [] [] []    Neurological [] [] []    Patellar Mobility [] [] []    Patellar Orientation [] [] []    Gait [] [] [] Analysis:         Functional Test: LEFI Score: 34% functionally impaired     Comments:    Assessment:  Patient would benefit from skilled physical therapy services in order to: L calf extensibility and strength to decrease pain    Problem list, as detailed above:   [x] ? Pain  in L AT   [x] ? ROM With DF   [x] ? Strength eccentrically with PF  [x] ? Function: Decreased walking tolerance as she can only walk 6 minutes    [] Gait Deviations  [] Other     STG: (to be met in 6 treatments)  ? Pain: <4/10 in L AT  ? ROM: with DF to normal   ? Strength: eccentrically with PF  ? Function:  able to walk for 15 minutes without increased  pain  Patient to be independent with home exercise program as demonstrated by performance with correct form without cues.  Demonstrate Knowledge of fall prevention  LTG: (to be met in 12 treatments)  No pain in L AT  Able to walk for 30 min without increased pain  Able to perform 3x10 eccentric calf raises.                   Patient goals: \"to feel better with no pain and to walk and stand without pain.\"    Rehab Potential:  [] Good  [x] Fair  [] Poor   Suggested Professional Referral:  [x] No  [] Yes:  Barriers to Goal Achievement:  [x] No  [] Yes:  Domestic Concerns:  [x] No  [] Yes:    Pt. Education:  [x] Plans/Goals, Risks/Benefits discussed  [x] Home exercise program    Method of Education: [x] Verbal  [x] Demo  [x] Written  Comprehension of Education:  [] Verbalizes understanding.  [] Demonstrates understanding.  [x] Needs Review.  []

## 2024-04-15 ENCOUNTER — HOSPITAL ENCOUNTER (OUTPATIENT)
Dept: PHYSICAL THERAPY | Facility: CLINIC | Age: 34
Setting detail: THERAPIES SERIES
Discharge: HOME | End: 2024-04-15
Attending: PODIATRIST
Payer: COMMERCIAL

## 2024-04-15 PROCEDURE — 97110 THERAPEUTIC EXERCISES: CPT

## 2024-04-15 PROCEDURE — 97016 VASOPNEUMATIC DEVICE THERAPY: CPT

## 2024-04-15 PROCEDURE — 97140 MANUAL THERAPY 1/> REGIONS: CPT

## 2024-04-15 NOTE — FLOWSHEET NOTE
[x] [facility]  Outpatient Rehabilitation &  Therapy  [address]  P: [phone]  F: [phone]      Physical Therapy Daily Treatment Note    Date:  4/15/2024  Patient Name:  Ayaka Coughlin    :  1990  MRN: [id number]  Physician: Dr. Bhupinder Gonsalez                             Insurance: Medicare  Medical Diagnosis: L Achilles tendonitis                  Rehab Codes: M76.62  Onset date: 24                 Next Dr's appt.:   Visit# / total visits: 2     Cancels/No Shows: 0/0    Subjective:    Pain:  [x] Yes  [] No Location:   L Achilles tendon Pain Rating: (0-10 scale) 8/10  Pain altered Tx:  [] No  [] Yes  Action:  Comments: Pt having  pain today on arrival. Applied a lidocaine patches  and Monday evening and states it helped a lot. [place] good after last session.    Objective:  Modalities:   Precautions: NO calf stretching   Exercises:  Exercise Reps/ Time Weight/ Level Completed Comments                Nustep  8min.  L4  X                 Gym           PF isometrics  10x10\"   X Off step   Eccnentric 1 calf raises pain   --     4 way hip 2x10 lime X L in CKC   BAPS 3x10 L2 X                  Clamshells            SL abduction            Bridges                           Other:    Instructions for subsequent visits:      Treatment Charges: Mins Units   []  Modalities     [x]  Ther Exercise 32 2   [x]  Manual Therapy 12 1   []  Ther Activities     []  Aquatics     [x]  Vasocompression 15 1   []  Other     Total Treatment time 59 4       Assessment: [] Progressing toward goals. After warm up on Nu Step, performed manual to L Achilles and calf  adding in DF/PF motions. TTP area at medial malleolus. Pt challenged with controlling BAPS board INV motion and circles. Applied [employer] Vasocompression to decrease pain and sx d/t increased pain at start of session.    [] No change.     [] Other:      STG: (to be met in 6 treatments)  ? Pain: <4/10 in L AT  ? ROM:

## 2024-04-18 NOTE — FLOWSHEET NOTE
[] [facility] [address]  [address]  [phone]  F: [phone] [] [address]  [address]   [address]  P: [phone]  F: [phone] [] [address]  [address]  P: [phone]  F: [phone] [] [address]  P: [phone]  F: [phone] [] [address]  [facility]   [address]   [place]: [phone]  F: [phone]      Physical Therapy Daily Aquatic Treatment Note    Date:  2023  Patient Name:  Anastacio Phillips    :  1990  MRN: [id number]  Physician: MORENITA Matta                                Insurance: MEDICARE ([employer]  Medical Diagnosis: M54.50, G89.29 (ICD-10-CM) - Chronic midline low back pain without sciatica  M25.561, M25.562 (ICD-10-CM) - Pain in both knees, unspecified chronicity  Rehab Codes: M54.5  Onset Date: 23               Next 's appt. : 6/15/23  Visit# / total visits: 3/16; Progress note for Medicare patient due at visit 10     Cancels/No Shows:     Subjective:    Pain:  [x] Yes  [] No Location: B knees Pain Rating: (0-10 scale) 8/10  Pain altered Tx:  [] No  [] Yes  Action:  Comments: Sore following first aquatic session. States she is in more pain today from running errands today. Also mentions she did not take gabapentin today.      Objective:  Precautions:  KEY  B = Belt G = Gloves P= Paddles   C = Collar K = Kickboard T = Theratube   DB = Dumbells N = Noodle W = Weights     Exercises/Activities  Warm-up/Amb / Dynamic Exercises    Forward 3L March 3L   Sideways 3L Squat    Backwards 3L Retro HS curls      Retro SLR    Stretches  Braiding    Gastroc/Soleus  Heel to Toe amb    Hamstring 3x30\" Toe amb    Hip flexor  Heel amb    Piriformis      SKTC
18-Apr-2024 15:25

## 2024-04-22 ENCOUNTER — HOSPITAL ENCOUNTER (OUTPATIENT)
Dept: PHYSICAL THERAPY | Facility: CLINIC | Age: 34
Setting detail: THERAPIES SERIES
Discharge: HOME | End: 2024-04-22
Attending: PODIATRIST
Payer: COMMERCIAL

## 2024-04-22 PROCEDURE — 97016 VASOPNEUMATIC DEVICE THERAPY: CPT

## 2024-04-22 PROCEDURE — 97110 THERAPEUTIC EXERCISES: CPT

## 2024-04-22 NOTE — FLOWSHEET NOTE
[x] [facility]  Outpatient Rehabilitation &  Therapy  [address]  P: [phone]  F: [phone]      Physical Therapy Daily Treatment Note    Date:  2024  Patient Name:  Ayaka Coughlin    :  1990  MRN: [id number]  Physician: Dr. Bhupinder Gonsalez                             Insurance: Medicare  Medical Diagnosis: L Achilles tendonitis                  Rehab Codes: M76.62  Onset date: 24                 Next Dr's appt.:   Visit# / total visits: 3/12     Cancels/No Shows: 0/0    Subjective:    Pain:  [x] Yes  [] No Location:   L Achilles tendon Pain Rating: (0-10 scale) 5/10  Pain altered Tx:  [] No  [] Yes  Action:  Comments: Pt woke up having 1-2/10 pain and then pain increased throughout day. Pt states she wore her heels prior to coming in today. Continuing use of lidocaine patches as needed for pain.  Objective:  Modalities:   Precautions: NO calf stretching   Exercises:  Exercise Reps/ Time Weight/ Level Completed Comments                Nustep  8min.  L4  x                 Gym           PF isometrics  10x10\"   x Off step   Eccentric 1 calf raises pain   --     4 way hip 2x10 lime x L in CKC   BAPS 3x10 L2 x  fwd/back, s/s, cw/ccw                Clamshells  1x10, 1x5  Lime  x      SL abduction  1x10    x      Bridges  2x10 3\"  Lime  x                     Other:    Instructions for subsequent visits:      Treatment Charges: Mins Units   []  Modalities     [x]  Ther Exercise 35 2   []  Manual Therapy     []  Ther Activities     []  Aquatics     [x]  Vasocompression 15 1   []  Other     Total Treatment time 50 3       Assessment: [x] Progressing toward goals. Progresses session with SL hip and glute strengthening. Min. VC needed for proper glute activation d/t pt feeling it in abductors. Min VC given for heels down and lift toes. Pt able to self correct but still demos fatigue with bridge and SL ex. completing 1x10 and 1x5 req rest break in between. Pt demos

## 2024-04-29 ENCOUNTER — OFFICE VISIT (OUTPATIENT)
Dept: PODIATRY | Age: 34
End: 2024-04-29
Payer: COMMERCIAL

## 2024-04-29 VITALS — HEIGHT: 68 IN | BODY MASS INDEX: 40.01 KG/M2 | WEIGHT: 264 LBS

## 2024-04-29 DIAGNOSIS — M24.572 EQUINUS CONTRACTURE OF LEFT ANKLE: ICD-10-CM

## 2024-04-29 DIAGNOSIS — M77.8 ENTHESOPATHY OF LEFT FOOT: ICD-10-CM

## 2024-04-29 DIAGNOSIS — M76.62 ACHILLES TENDINITIS, LEFT LEG: Primary | ICD-10-CM

## 2024-04-29 DIAGNOSIS — M79.672 PAIN IN LEFT FOOT: ICD-10-CM

## 2024-04-29 PROCEDURE — 99213 OFFICE O/P EST LOW 20 MIN: CPT | Performed by: PODIATRIST

## 2024-04-29 RX ORDER — DEXTROAMPHETAMINE SACCHARATE, AMPHETAMINE ASPARTATE MONOHYDRATE, DEXTROAMPHETAMINE SULFATE AND AMPHETAMINE SULFATE 2.5; 2.5; 2.5; 2.5 MG/1; MG/1; MG/1; MG/1
10 CAPSULE, EXTENDED RELEASE ORAL DAILY
COMMUNITY
Start: 2024-03-25

## 2024-04-29 RX ORDER — ARIPIPRAZOLE 15 MG/1
15 TABLET ORAL DAILY
COMMUNITY
Start: 2024-04-23

## 2024-04-29 ASSESSMENT — ENCOUNTER SYMPTOMS
NAUSEA: 0
DIARRHEA: 0
COLOR CHANGE: 0
SHORTNESS OF BREATH: 0
BACK PAIN: 0

## 2024-04-29 NOTE — PROGRESS NOTES
[facility] Podiatry  Return Patient Progress Note    Subjective: Ayaka Coughlin 34 y.o. female that presents for follow up evaluation of Achilles tendinitis and equinus left.  Chief Complaint   Patient presents with    Ankle Pain     PT follow up     Patient's treatment thus far has included physical therapy.  Pain is rated 4 out of 10 and is described as intermittent. Patient has been following my prescribed course of therapy as instructed.     Review of Systems   Constitutional:  Negative for activity change, appetite change, chills, diaphoresis, fatigue and fever.   Respiratory:  Negative for shortness of breath.    Cardiovascular:  Negative for leg swelling.   Gastrointestinal:  Negative for diarrhea and nausea.   Endocrine: Negative for cold intolerance, heat intolerance and polyuria.   Musculoskeletal:  Negative for arthralgias, back pain, gait problem, joint swelling and myalgias.   Skin:  Negative for color change, pallor, rash and wound.   Allergic/Immunologic: Negative for environmental allergies and food allergies.   Neurological:  Negative for dizziness, weakness, light-headedness and numbness.   Hematological:  Does not bruise/bleed easily.   Psychiatric/Behavioral:  Negative for behavioral problems, confusion and self-injury. The patient is not nervous/anxious.        Objective: Clinical evaluation of the patient reveals continued soft tissue resistance upon passive dorsiflexion of the left ankle with the knee(s) extended. However, this soft tissue resistance is not present with the knee(s) flexed.  Further, this soft tissue resistance has decreased in severity since last visit.  There is no pain today with palpation to the watershed region of the left Achilles tendon. There is only mild remaining pain with palpation at the insertion of the Achilles tendon on the calcaneus left lower extremity. There is no bulbous deformity noted to the Achilles tendon watershed region. There is no crepitus noted

## 2024-05-06 ENCOUNTER — HOSPITAL ENCOUNTER (OUTPATIENT)
Dept: PHYSICAL THERAPY | Facility: CLINIC | Age: 34
Setting detail: THERAPIES SERIES
Discharge: HOME | End: 2024-05-06
Attending: PODIATRIST
Payer: COMMERCIAL

## 2024-05-06 PROCEDURE — 97016 VASOPNEUMATIC DEVICE THERAPY: CPT

## 2024-05-06 PROCEDURE — 97110 THERAPEUTIC EXERCISES: CPT

## 2024-05-06 NOTE — FLOWSHEET NOTE
[x] [facility]  Outpatient Rehabilitation &  Therapy  [address]  P: [phone]  F: [phone]      Physical Therapy Daily Treatment Note    Date:  2024  Patient Name:  Ayaka Coughlin    :  1990  MRN: [id number]  Physician: Dr. Bhupinder Gonsalez                             Insurance: Medicare  Medical Diagnosis: L Achilles tendonitis                  Rehab Codes: M76.62  Onset date: 24                 Next Dr's appt.: 24  Visit# / total visits:      Cancels/No Shows: 0/0    Subjective:    Pain:  [x] Yes  [] No Location:   L Achilles tendon Pain Rating: (0-10 scale) 5/10  Pain altered Tx:  [] No  [] Yes  Action:  Comments: Pt mentioned no changes for the better. Stated that her foot doctor wishes for her to continue PT until she sees doc again in 4 weeks.   Objective:  Modalities:   Precautions: NO calf stretching   Exercises:  Exercise Reps/ Time Weight/ Level Comments     Airdyne  10m     x              Gym          PF isometrics  10x10\"   Off step    Eccentric calf raises 2x10     x   4 way hip 2x10 lime L in CKC    BAPS 3x10 L2  fwd/back, s/s, cw/ccw x   MOBO 3x10 2/4, 1/3 Right foot on 2\" step x   MOBO balance 3x10 2/4, 1/3  x               Clamshells  1x10, 1x5  Lime   x    SL abduction  1x10     x    Bridges  2x10 3\"  Lime   x                  Other: [facility]    Instructions for subsequent visits:      Treatment Charges: Mins Units   []  Modalities     [x]  Ther Exercise 40 3   []  Manual Therapy     []  Ther Activities     []  Aquatics     [x]  Vasocompression 15 1   []  Other     Total Treatment time 55 4       Assessment: [x] Progressing toward goals. Added in MOBO exercises and balance today. Pt did require UE support and a 2\" step for opposite foot. Completed FWB on BAPS with F/B but did drop back to PWB with all other directions. Able to perform eccentric calf raises today at vertical bars but did have a lot

## 2024-05-14 ENCOUNTER — HOSPITAL ENCOUNTER (OUTPATIENT)
Dept: PHYSICAL THERAPY | Facility: CLINIC | Age: 34
Setting detail: THERAPIES SERIES
Discharge: HOME | End: 2024-05-14
Attending: PODIATRIST
Payer: COMMERCIAL

## 2024-05-14 NOTE — FLOWSHEET NOTE
[] [facility]  Outpatient Rehabilitation &  Therapy  [address]    [phone]  F: [phone]   [] [facility]  Outpatient Rehabilitation &  Therapy  [address]   [address]  P: [phone]  F: [phone]  [x] [facility]  Outpatient Rehabilitation &  Therapy  [address]  P: [phone]  F: [phone] [] [facility]  Outpatient Rehabilitation &  Therapy  [address]  P: [phone]  F: [phone]  [] [facility]  Outpatient Rehabilitation &  Therapy  [address]   P: [phone]  F: [phone]   [] [facility]   Outpatient Rehabilitation & Therapy  [address]  P: [phone]   F: [phone]     Physical Therapy Cancel/No Show note    Date: 2024  Patient: Ayaka Coughlin  : 1990  MRN: [id number]    Cancels/No Shows to date:     For today's appointment patient:    [x]  Cancelled    [] Rescheduled appointment    [] No-show     Reason given by patient:    []  Patient ill    []  Conflicting appointment    [x] No transportation      [] Conflict with work    [] No reason given    [] Weather related    [] COVID-19    [] Other:      Comments:        [x] Next appointment was confirmed    Electronically signed by: Shalom Nicholson PTA

## 2024-05-17 ENCOUNTER — OFFICE VISIT (OUTPATIENT)
Dept: PAIN MANAGEMENT | Age: 34
End: 2024-05-17
Payer: COMMERCIAL

## 2024-05-17 VITALS — BODY MASS INDEX: 40.01 KG/M2 | WEIGHT: 264 LBS | HEIGHT: 68 IN

## 2024-05-17 DIAGNOSIS — M47.817 LUMBOSACRAL SPONDYLOSIS WITHOUT MYELOPATHY: Primary | ICD-10-CM

## 2024-05-17 PROCEDURE — 99214 OFFICE O/P EST MOD 30 MIN: CPT | Performed by: PAIN MEDICINE

## 2024-05-17 ASSESSMENT — ENCOUNTER SYMPTOMS
BACK PAIN: 1
BOWEL INCONTINENCE: 0

## 2024-05-17 NOTE — PROGRESS NOTES
HPI:     Back Pain  This is a chronic problem. The current episode started more than 1 year ago. The problem occurs intermittently. The problem is unchanged. The pain is present in the lumbar spine. The quality of the pain is described as aching. The pain does not radiate. The pain is at a severity of 5/10. The pain is moderate. The pain is Worse during the day. The symptoms are aggravated by bending, position, twisting, standing, sitting and stress. Stiffness is present In the morning. Pertinent negatives include no bladder incontinence or bowel incontinence. She has tried home exercises, bed rest, walking, chiropractic manipulation, ice, heat, NSAIDs and analgesics for the symptoms.     Nonradiating low back pain.  MRI lumbar spine with degenerative changes and varying levels of stenosis.  MRI thoracic mild multilevel degenerative changes.  Did physical therapy.  Continues to have lingering back pain.    Pain ranges from a 3/10 to a 9/10 depending on activity.    Patient denies any new neurological symptoms. Nobowel or bladder incontinence, no weakness, and no falling.    Review of OARRS does not show any aberrant prescription behavior.     Past Medical History:   Diagnosis Date    ADHD (attention deficit hyperactivity disorder)     Bipolar disorder (HCC)     Chronic back pain     Depression     Learning disability        Past Surgical History:   Procedure Laterality Date     SECTION         Allergies   Allergen Reactions    Seasonal          Current Outpatient Medications:     amphetamine-dextroamphetamine (ADDERALL XR) 10 MG extended release capsule, Take 1 capsule by mouth daily., Disp: , Rfl:     ARIPiprazole (ABILIFY) 15 MG tablet, Take 1 tablet by mouth daily, Disp: , Rfl:     PREDNISONE PO, Take by mouth (Patient not taking: Reported on 2024), Disp: , Rfl:     traZODone (DESYREL) 50 MG tablet, Take 1 tablet by mouth nightly, Disp: 30 tablet, Rfl: 0    meloxicam (MOBIC) 15 MG tablet, Take 1

## 2024-05-20 ENCOUNTER — HOSPITAL ENCOUNTER (OUTPATIENT)
Dept: PHYSICAL THERAPY | Facility: CLINIC | Age: 34
Setting detail: THERAPIES SERIES
Discharge: HOME | End: 2024-05-20
Attending: PODIATRIST
Payer: COMMERCIAL

## 2024-05-20 PROCEDURE — 97110 THERAPEUTIC EXERCISES: CPT

## 2024-05-20 NOTE — PATIENT INSTRUCTIONS

## 2024-05-20 NOTE — FLOWSHEET NOTE
[x] [facility]  Outpatient Rehabilitation &  Therapy  [address]  P: [phone]  F: [phone]      Physical Therapy Daily Treatment Note    Date:  2024  Patient Name:  Ayaka Coughlin    :  1990  MRN: [id number]  Physician: Dr. Bhupinder Gonsalez                             Insurance: Medicare  Medical Diagnosis: L Achilles tendonitis                  Rehab Codes: M76.62  Onset date: 24                 Next Dr's appt.: 24  Visit# / total visits:      Cancels/No Shows: 1    Subjective:    Pain:  [] Yes  [x] No Location:   L Achilles tendon Pain Rating: (0-10 scale) 0/10  Pain altered Tx:  [] No  [] Yes  Action:  Comments: Pt mentioned having no pain today. But pain at work on Saturday.   Objective:  Modalities:   Precautions: NO calf stretching   Exercises:  Exercise Reps/ Time Weight/ Level Comments     Airdyne  10m     x              Gym          Eccentric calf raises 2x10     x   4 way hip 2x10 lime L in CKC    BAPS 3x10 L2  fwd/back, s/s, cw/ccw    MOBO 3x10 2/4, 1/3 Right foot on 2\" step x   MOBO balance 3x30\" 2/4, 1/3  x               Clamshells 2x10  Lime   x    SL abduction 2x10  Lime   x    Bridges  2x10 3\"  Lime   x                  Other: Pt unable to FWB with side/side on BAPS 24    Instructions for subsequent visits:      Treatment Charges: Mins Units   []  Modalities     [x]  Ther Exercise 40 3   []  Manual Therapy     []  Ther Activities     []  Aquatics     [x]  Vasocompression 15 1   []  Other     Total Treatment time 55 4       Assessment: [x] Progressing toward goals. Pt able to continued working on MOBO with attempts both successful at the beginning performing FWB but fatigue setting in and having to rely more on opposite foot for weight distribution. Pt did not wish to perform BAPS d/t increased fatigue and soreness. Focused on hip strengthening for added support with pt able to complete increased reps in abduction today.        []

## 2024-05-28 ENCOUNTER — OFFICE VISIT (OUTPATIENT)
Dept: PODIATRY | Age: 34
End: 2024-05-28

## 2024-05-28 VITALS — WEIGHT: 264 LBS | BODY MASS INDEX: 40.01 KG/M2 | HEIGHT: 68 IN

## 2024-05-28 DIAGNOSIS — M77.8 ENTHESOPATHY OF LEFT FOOT: ICD-10-CM

## 2024-05-28 DIAGNOSIS — M79.672 PAIN IN LEFT FOOT: ICD-10-CM

## 2024-05-28 DIAGNOSIS — M76.62 ACHILLES TENDINITIS, LEFT LEG: Primary | ICD-10-CM

## 2024-05-28 DIAGNOSIS — M24.572 EQUINUS CONTRACTURE OF LEFT ANKLE: ICD-10-CM

## 2024-06-03 ENCOUNTER — APPOINTMENT (OUTPATIENT)
Dept: PHYSICAL THERAPY | Facility: CLINIC | Age: 34
End: 2024-06-03
Attending: PODIATRIST
Payer: COMMERCIAL

## 2024-06-17 ENCOUNTER — HOSPITAL ENCOUNTER (OUTPATIENT)
Dept: PHYSICAL THERAPY | Facility: CLINIC | Age: 34
Setting detail: THERAPIES SERIES
Discharge: HOME | End: 2024-06-17
Attending: PODIATRIST
Payer: COMMERCIAL

## 2024-06-17 ENCOUNTER — HOSPITAL ENCOUNTER (OUTPATIENT)
Age: 34
Discharge: HOME | End: 2024-06-17
Payer: COMMERCIAL

## 2024-06-17 DIAGNOSIS — M47.817 LUMBOSACRAL SPONDYLOSIS WITHOUT MYELOPATHY: ICD-10-CM

## 2024-06-17 LAB
ERYTHROCYTE [DISTWIDTH] IN BLOOD BY AUTOMATED COUNT: 14 % (ref 11.8–14.4)
HCT VFR BLD AUTO: 38.7 % (ref 36.3–47.1)
HGB BLD-MCNC: 12.3 G/DL (ref 11.9–15.1)
MCH RBC QN AUTO: 27.5 PG (ref 25.2–33.5)
MCHC RBC AUTO-ENTMCNC: 31.8 G/DL (ref 28.4–34.8)
MCV RBC AUTO: 86.4 FL (ref 82.6–102.9)
NRBC BLD-RTO: 0 PER 100 WBC
PLATELET # BLD AUTO: 282 K/UL (ref 138–453)
PMV BLD AUTO: 11.8 FL (ref 8.1–13.5)
RBC # BLD AUTO: 4.48 M/UL (ref 3.95–5.11)
WBC OTHER # BLD: 11.5 K/UL (ref 3.5–11.3)

## 2024-06-17 PROCEDURE — 97110 THERAPEUTIC EXERCISES: CPT

## 2024-06-17 PROCEDURE — 36415 COLL VENOUS BLD VENIPUNCTURE: CPT

## 2024-06-17 PROCEDURE — 85027 COMPLETE CBC AUTOMATED: CPT

## 2024-06-17 NOTE — FLOWSHEET NOTE
[x] [facility]  Outpatient Rehabilitation &  Therapy  [address]  P: [phone]  F: [phone]     Physical Therapy Daily Treatment Note    Date:  2024  Patient Name:  Ayaka Coughlin    :  1990  MRN: [id number]  Physician: Dr. Bhupinder Gonsalez               Insurance: Medicare  Medical Diagnosis: L Achilles tendonitis    Rehab Codes: M76.62  Onset date: 24                   Next Dr's appt.:   Visit# / total visits:    Cancels/No Shows: 2/0    Subjective:    Pain:  [] Yes  [x] No Location:   L Achilles tendon Pain Rating: (0-10 scale) 0/10  Pain altered Tx:  [] No  [] Yes  Action:  Comments: pt saw Dr. Gonsalez on  and was given 6mm heel lift, which she admits that she is not wearing today.  Overall, she reports that her pain in L calf is resolved. She is not doing her HEP, as \"I forget things\"    Objective:  Modalities: none  Precautions:  standard  Exercises:  Exercise Reps/ Time Weight/ Level Comments    Gym          Eccentric calf raises 2x10     x   4 way hip 2x10 BLUE L in CKC, issued blue X   BAPS 3x10 L3   cw/ccw X   MOBO 3x10 , 1/3 Right foot on 2\" step x   MOBO balance 3x30\" , 1/3  --               Clamshells 2x10  Lime       SL abduction 2x10  Lime       Bridges  2x10 3\"  Lime                     Other:     Instructions for subsequent visits:      Treatment Charges: Mins Units   []  Modalities     [x]  Ther Exercise 25 2   []  Manual Therapy     []  Ther Activities     []  Aquatics     []  Vasocompression     []  Other     Total Treatment time         Assessment: [x] Progressing toward goals. Pt wishes to continue with her HEP at this point and follow up with Dr. Gonsalez at this point.   \"I don't think I need to continue here.\"       [] No change.     [] Other:      STG: (to be met in 6 treatments)  ? Pain: <4/10 in L AT  ? ROM: with DF to normal   ? Strength: eccentrically with PF  ? Function:  able to walk for 15 minutes without

## 2024-06-19 DIAGNOSIS — M47.817 LUMBOSACRAL SPONDYLOSIS WITHOUT MYELOPATHY: Primary | ICD-10-CM

## 2024-06-20 ENCOUNTER — HOSPITAL ENCOUNTER (OUTPATIENT)
Age: 34
Discharge: HOME | End: 2024-06-20
Payer: COMMERCIAL

## 2024-06-20 ENCOUNTER — HOSPITAL ENCOUNTER (OUTPATIENT)
Dept: PAIN MANAGEMENT | Facility: CLINIC | Age: 34
Discharge: HOME | End: 2024-06-20
Payer: COMMERCIAL

## 2024-06-20 VITALS
RESPIRATION RATE: 13 BRPM | WEIGHT: 285 LBS | HEIGHT: 68 IN | DIASTOLIC BLOOD PRESSURE: 96 MMHG | OXYGEN SATURATION: 97 % | BODY MASS INDEX: 43.19 KG/M2 | SYSTOLIC BLOOD PRESSURE: 161 MMHG | TEMPERATURE: 97.5 F | HEART RATE: 93 BPM

## 2024-06-20 DIAGNOSIS — R52 PAIN MANAGEMENT: ICD-10-CM

## 2024-06-20 DIAGNOSIS — M47.817 LUMBOSACRAL SPONDYLOSIS WITHOUT MYELOPATHY: ICD-10-CM

## 2024-06-20 LAB
ERYTHROCYTE [DISTWIDTH] IN BLOOD BY AUTOMATED COUNT: 14.8 % (ref 12.5–15.4)
HCT VFR BLD AUTO: 38.4 % (ref 36–46)
HGB BLD-MCNC: 12.8 G/DL (ref 12–16)
MCH RBC QN AUTO: 27.7 PG (ref 26–34)
MCHC RBC AUTO-ENTMCNC: 33.4 G/DL (ref 31–37)
MCV RBC AUTO: 82.9 FL (ref 80–100)
PLATELET # BLD AUTO: 258 K/UL (ref 140–450)
PMV BLD AUTO: 9.3 FL (ref 6–12)
RBC # BLD AUTO: 4.63 M/UL (ref 4–5.2)
WBC OTHER # BLD: 11.6 K/UL (ref 3.5–11)

## 2024-06-20 PROCEDURE — 85027 COMPLETE CBC AUTOMATED: CPT

## 2024-06-20 PROCEDURE — 36415 COLL VENOUS BLD VENIPUNCTURE: CPT

## 2024-06-20 ASSESSMENT — PAIN DESCRIPTION - DESCRIPTORS: DESCRIPTORS: SHARP

## 2024-06-20 ASSESSMENT — PAIN - FUNCTIONAL ASSESSMENT: PAIN_FUNCTIONAL_ASSESSMENT: 0-10

## 2024-06-20 NOTE — DISCHARGE INSTRUCTIONS
You have received a sedative/anesthetic therefore you should not consume any alcoholic beverages for 24 hours.  Do not drive or operate machinery for 24 hours.   Do not take a tub bath for 72 hours after procedure (this includes hot tubs).  You may shower, but avoid hot water to injection site.   Avoid strenuous activity TODAY especially if you experience dizziness.   Remove band-aid the next day.    Wash off any residual iodine 24 hours from today.   Do not use heat, heating pad, or any other heating device over the injection site for 3 days after the procedure.    If you experience pain after your procedure, you may continue with your current pain medication as prescribed.  (DO NOT INCREASE YOUR PAIN MEDICATION WITHOUT TALKING TO DOCTOR)  Soreness and pain at injection site is common, may use ice to reduce soreness.    Please complete pain diary as instructed. The office staff will call you to discuss the results of the procedure within 24 hours, please call the office if you do not hear from them.      Call [facility] at [phone] if you experience:   Fever, chills or temperature over 100    Vomiting, headache, persistent stiff neck, nausea or blurred vision   Difficulty urinating or unable to urinate within 8 hours   Increase in weakness, numbness or loss of function of limbs  Increased redness, swelling or drainage at the injection site

## 2024-06-20 NOTE — H&P
affect normal.   Cardiovascular:      Rate: Normal rate.         ASA: 3          Mallampati: 2       Patient's current physical status, medications, medical history, and HPI have been reviewed and updated as appropriate on this date: 06/20/24    Risk/Benefit(s): The risks, benefits, alternatives, and potential complications have been discussed with the patient/family and informed consent has been obtained for the procedure/sedation.    Diagnosis:   Spondylosis      Plan: Elevated WBC, nonspecific. Out of an abundance of caution will reschedule elective spinal pain procedure.  F/u pcp  If develops signs/symptoms of infection instructed to to to ER.  Would recheck before procedure.        Dulce Elizondo MD

## 2024-06-20 NOTE — PROGRESS NOTES
Dr. Elizondo at bedside explained about elevated WBC 11.6. Patient was explained increase risk of infection due to that WBC and procedure was cancelled. Patient understood. Blood work will be repeated prior to procedure.

## 2024-06-24 ENCOUNTER — OFFICE VISIT (OUTPATIENT)
Dept: PODIATRY | Age: 34
End: 2024-06-24
Payer: COMMERCIAL

## 2024-06-24 VITALS — BODY MASS INDEX: 43.19 KG/M2 | WEIGHT: 285 LBS | HEIGHT: 68 IN

## 2024-06-24 DIAGNOSIS — M79.672 PAIN IN LEFT FOOT: ICD-10-CM

## 2024-06-24 DIAGNOSIS — M76.62 ACHILLES TENDINITIS, LEFT LEG: Primary | ICD-10-CM

## 2024-06-24 DIAGNOSIS — M77.8 ENTHESOPATHY OF LEFT FOOT: ICD-10-CM

## 2024-06-24 DIAGNOSIS — M24.572 EQUINUS CONTRACTURE OF LEFT ANKLE: ICD-10-CM

## 2024-06-24 PROCEDURE — 99213 OFFICE O/P EST LOW 20 MIN: CPT | Performed by: PODIATRIST

## 2024-06-24 ASSESSMENT — ENCOUNTER SYMPTOMS
BACK PAIN: 0
COLOR CHANGE: 0
SHORTNESS OF BREATH: 0
NAUSEA: 0
DIARRHEA: 0

## 2024-06-24 NOTE — PROGRESS NOTES
[facility] Podiatry  Return Patient Progress Note    Subjective: Ayaka Coughlin 34 y.o. female that presents for follow up evaluation of Achilles tendinitis and equinus left.  Chief Complaint   Patient presents with    Ankle Pain     Follow up left ankle/ PT follow up     Patient's treatment thus far has included physical therapy.  Pain is rated 0 out of 10 and is described as none. Patient has been following my prescribed course of therapy as instructed.  Patient states that she has graduated from physical therapy.  Patient states that they gave her a list of exercises to do at home.    Review of Systems   Constitutional:  Negative for activity change, appetite change, chills, diaphoresis, fatigue and fever.   Respiratory:  Negative for shortness of breath.    Cardiovascular:  Negative for leg swelling.   Gastrointestinal:  Negative for diarrhea and nausea.   Endocrine: Negative for cold intolerance, heat intolerance and polyuria.   Musculoskeletal:  Negative for arthralgias, back pain, gait problem, joint swelling and myalgias.   Skin:  Negative for color change, pallor, rash and wound.   Allergic/Immunologic: Negative for environmental allergies and food allergies.   Neurological:  Negative for dizziness, weakness, light-headedness and numbness.   Hematological:  Does not bruise/bleed easily.   Psychiatric/Behavioral:  Negative for behavioral problems, confusion and self-injury. The patient is not nervous/anxious.        Objective: Clinical evaluation of the patient reveals only slight remaining soft tissue resistance upon passive dorsiflexion of the left ankle with the knee(s) extended. However, this soft tissue resistance is not present with the knee(s) flexed.  There is no pain today with palpation to the watershed region of the left Achilles tendon. There is no remaining pain with palpation at the insertion of the Achilles tendon on the calcaneus left lower extremity. There is no bulbous deformity noted to

## 2024-07-02 ENCOUNTER — TELEPHONE (OUTPATIENT)
Dept: PAIN MANAGEMENT | Age: 34
End: 2024-07-02

## 2024-07-02 DIAGNOSIS — M47.817 LUMBOSACRAL SPONDYLOSIS WITHOUT MYELOPATHY: Primary | ICD-10-CM

## 2024-07-02 NOTE — TELEPHONE ENCOUNTER
Pt called wanting to know will the 7/18 procedure be done? Does more blood work need to be completed? Please advise

## 2024-07-09 ENCOUNTER — CARE COORDINATION (OUTPATIENT)
Dept: CARE COORDINATION | Age: 34
End: 2024-07-09

## 2024-07-09 NOTE — CARE COORDINATION
spoke to Ayaka.  Ayaka reports that she is doing \"well\".  Ayaka confirmed she is still at [employer] and collecting SS disability.   Ayaka reports that she is working on getting her  permit.  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    Ayaka denied questions or concerns.    will sign off.

## 2024-07-15 ENCOUNTER — TELEPHONE (OUTPATIENT)
Dept: PAIN MANAGEMENT | Age: 34
End: 2024-07-15

## 2024-07-15 NOTE — TELEPHONE ENCOUNTER
Patient was contacted regarding CBC that was previously ordered, still not completed, patient procedure cancelled per Dr. Elizondo until lab completed.

## 2024-07-16 ENCOUNTER — OFFICE VISIT (OUTPATIENT)
Dept: ORTHOPEDIC SURGERY | Age: 34
End: 2024-07-16
Payer: COMMERCIAL

## 2024-07-16 VITALS — HEIGHT: 68 IN | BODY MASS INDEX: 42.13 KG/M2 | WEIGHT: 278 LBS

## 2024-07-16 DIAGNOSIS — M22.2X1 PATELLOFEMORAL SYNDROME OF BOTH KNEES: Primary | ICD-10-CM

## 2024-07-16 DIAGNOSIS — M22.2X2 PATELLOFEMORAL SYNDROME OF BOTH KNEES: Primary | ICD-10-CM

## 2024-07-16 PROCEDURE — 99204 OFFICE O/P NEW MOD 45 MIN: CPT | Performed by: PHYSICIAN ASSISTANT

## 2024-07-16 NOTE — PROGRESS NOTES
[facility]                Nolan Alexander PA-C            [address], [address]               [place], [place] [address]           Dept Phone: [phone]           Dept Fax:  [phone] [address] [facility]                       [address]           [place], [place] [address]          Dept Phone: [phone]           Dept Fax:  [phone]      Chief Compliant:  Chief Complaint   Patient presents with    Knee Pain     NP: B/L knee pain        History of Present Illness:  This is a 34 y.o. female who presents to the clinic today for evaluation of had concerns including Knee Pain (NP: B/L knee pain).     Ms. Coughlin is a 34-year-old female presents for evaluation of chronic bilateral knee pain.  Patient reports pain started during her pregnancy with her daughter approximately 10 years ago but has continued ever since.  She states more recently her right knee pain has been more bothersome but it does seem to alternate between the 2.  She localizes pain most severely to the peripatellar area especially aggravated by any prolonged period of walking.  She also notes significant crepitus when going up and down stairs rising from seated position.  She does not recall 1 specific injury, trauma or fall.    Patient is currently taking meloxicam and utilizing lidocaine patches both of which provide adequate relief of her pain.  She does admit to taking a couple extra tablets of meloxicam on days that she works.    No history of bracing, physical therapy or injections on her knees in the recent past.       Past History:    Current Outpatient Medications:     amphetamine-dextroamphetamine (ADDERALL XR) 10 MG extended release capsule, Take 1 capsule by mouth daily., Disp: , Rfl:     ARIPiprazole (ABILIFY) 15 MG tablet, Take 1 tablet by mouth daily, Disp: , Rfl:     PREDNISONE PO, Take by mouth (Patient not taking: Reported on 7/16/2024), Disp: , Rfl:     traZODone (DESYREL) 50 MG

## 2024-07-29 ENCOUNTER — OFFICE VISIT (OUTPATIENT)
Dept: PRIMARY CARE CLINIC | Age: 34
End: 2024-07-29
Payer: COMMERCIAL

## 2024-07-29 ENCOUNTER — TELEPHONE (OUTPATIENT)
Dept: OBGYN CLINIC | Age: 34
End: 2024-07-29

## 2024-07-29 VITALS
OXYGEN SATURATION: 98 % | HEART RATE: 86 BPM | SYSTOLIC BLOOD PRESSURE: 122 MMHG | WEIGHT: 280 LBS | HEIGHT: 68 IN | BODY MASS INDEX: 42.44 KG/M2 | DIASTOLIC BLOOD PRESSURE: 86 MMHG

## 2024-07-29 DIAGNOSIS — R73.03 PREDIABETES: ICD-10-CM

## 2024-07-29 DIAGNOSIS — R10.31 CHRONIC RLQ PAIN: ICD-10-CM

## 2024-07-29 DIAGNOSIS — N91.2 AMENORRHEA: ICD-10-CM

## 2024-07-29 DIAGNOSIS — F41.9 ANXIETY: ICD-10-CM

## 2024-07-29 DIAGNOSIS — M46.1 SACROILIITIS (HCC): ICD-10-CM

## 2024-07-29 DIAGNOSIS — F81.9 LEARNING DISABILITY: Primary | ICD-10-CM

## 2024-07-29 DIAGNOSIS — M79.671 FOOT PAIN, BILATERAL: ICD-10-CM

## 2024-07-29 DIAGNOSIS — G89.29 CHRONIC MIDLINE LOW BACK PAIN WITHOUT SCIATICA: ICD-10-CM

## 2024-07-29 DIAGNOSIS — G89.29 CHRONIC RLQ PAIN: ICD-10-CM

## 2024-07-29 DIAGNOSIS — R79.9 ABNORMAL FINDING OF BLOOD CHEMISTRY, UNSPECIFIED: ICD-10-CM

## 2024-07-29 DIAGNOSIS — M54.50 CHRONIC MIDLINE LOW BACK PAIN WITHOUT SCIATICA: ICD-10-CM

## 2024-07-29 DIAGNOSIS — M79.672 FOOT PAIN, BILATERAL: ICD-10-CM

## 2024-07-29 PROBLEM — E66.01 SEVERE OBESITY (BMI 35.0-39.9) WITH COMORBIDITY (HCC): Status: RESOLVED | Noted: 2023-05-17 | Resolved: 2024-07-29

## 2024-07-29 LAB
CONTROL: NORMAL
PREGNANCY TEST URINE, POC: NEGATIVE

## 2024-07-29 PROCEDURE — 99214 OFFICE O/P EST MOD 30 MIN: CPT | Performed by: PHYSICIAN ASSISTANT

## 2024-07-29 PROCEDURE — G0447 BEHAVIOR COUNSEL OBESITY 15M: HCPCS | Performed by: PHYSICIAN ASSISTANT

## 2024-07-29 PROCEDURE — 81025 URINE PREGNANCY TEST: CPT | Performed by: PHYSICIAN ASSISTANT

## 2024-07-29 RX ORDER — LIDOCAINE 50 MG/G
1 PATCH TOPICAL DAILY
Qty: 30 PATCH | Refills: 0 | Status: SHIPPED | OUTPATIENT
Start: 2024-07-29 | End: 2024-08-28

## 2024-07-29 RX ORDER — BREXPIPRAZOLE 2 MG/1
2 TABLET ORAL DAILY
COMMUNITY
Start: 2024-06-27

## 2024-07-29 SDOH — ECONOMIC STABILITY: INCOME INSECURITY: HOW HARD IS IT FOR YOU TO PAY FOR THE VERY BASICS LIKE FOOD, HOUSING, MEDICAL CARE, AND HEATING?: SOMEWHAT HARD

## 2024-07-29 SDOH — ECONOMIC STABILITY: FOOD INSECURITY: WITHIN THE PAST 12 MONTHS, THE FOOD YOU BOUGHT JUST DIDN'T LAST AND YOU DIDN'T HAVE MONEY TO GET MORE.: NEVER TRUE

## 2024-07-29 SDOH — ECONOMIC STABILITY: FOOD INSECURITY: WITHIN THE PAST 12 MONTHS, YOU WORRIED THAT YOUR FOOD WOULD RUN OUT BEFORE YOU GOT MONEY TO BUY MORE.: NEVER TRUE

## 2024-07-29 ASSESSMENT — ENCOUNTER SYMPTOMS
ABDOMINAL DISTENTION: 0
DIARRHEA: 0
CONSTIPATION: 0
ABDOMINAL PAIN: 0
CHEST TIGHTNESS: 0
BACK PAIN: 1
COUGH: 0
SORE THROAT: 0
SHORTNESS OF BREATH: 0

## 2024-07-29 NOTE — TELEPHONE ENCOUNTER
Aykaa,    I do not know why you aren't menstruating without taking a history, doing and exam, and workup. There are many reasons women stop menstruating. Please keep your appointment and we will figure it out for you!    Dr. Rodriguez  
Patient called, scheduled pt with Dr Rodriguez for Monday Nov 4, 2024 @ 2pm.     Patient called stating wants to know what is going on with not having periods. Pt stated last mentral cycle was 3-6 months ago and no possibility of being pregnant.   
- - -

## 2024-07-29 NOTE — PROGRESS NOTES
[facility]  [facility] PRIMARY CARE  [address] [address]  Dept: [phone]    Ayaka Coughlin is a 34 y.o. female Established patient, who presents today for her medical conditions/complaints as noted below.      Chief Complaint   Patient presents with    Foot Pain     Patient states she has not been taking gabapentin or prednisone. Patient states she has been using lidocaine 5% patched she has been using was working well with the mobic       HPI:     HPI: The patient is a pleasant 34-year-old female who presents today with concerns of foot pain has not been taking her gabapentin or prednisone.  Patient has been using lidocaine 5% patch and Mobic.  Patient states she was using multiple Mobic daily. Used heel inserts and did see podiatry. Did PT as well. Did get knee brace.     No period for 3-6 months. No sexual partner in the last 6  months.     Following with psychiatry. NO breast discharge.     Reviewed prior notes None  Reviewed previous Labs    No components found for: \"LDLCHOLESTEROL\", \"LDLCALC\"    (goal LDL is <100)   No results found for: \"AST\", \"ALT\", \"BUN\", \"CR\", \"LABA1C\", \"TSH\"  BP Readings from Last 3 Encounters:   24 122/86   24 (!) 161/96   24 122/80          (goal 120/80)  No results found for: \"LABA1C\"  Past Medical History:   Diagnosis Date    ADHD (attention deficit hyperactivity disorder)     Bipolar disorder (HCC)     Chronic back pain     Depression     Learning disability       Past Surgical History:   Procedure Laterality Date     SECTION         Family History   Problem Relation Age of Onset    High Blood Pressure Mother     High Blood Pressure Father     Diabetes Father        Social History     Tobacco Use    Smoking status: Every Day     Current packs/day: 1.00     Types: E-Cigarettes, Cigarettes    Smokeless tobacco: Never   Substance Use Topics    Alcohol use: Yes     Comment: ocassionally       Current

## 2024-08-05 ENCOUNTER — HOSPITAL ENCOUNTER (OUTPATIENT)
Dept: PHYSICAL THERAPY | Facility: CLINIC | Age: 34
Setting detail: THERAPIES SERIES
Discharge: HOME | End: 2024-08-05
Attending: PODIATRIST
Payer: COMMERCIAL

## 2024-08-05 PROCEDURE — 97161 PT EVAL LOW COMPLEX 20 MIN: CPT

## 2024-08-05 PROCEDURE — 97110 THERAPEUTIC EXERCISES: CPT

## 2024-08-05 NOTE — CONSULTS
[] [facility]  Outpatient Rehabilitation &  Therapy  [address]  [phone]  [phone] [] [facility]  Outpatient Rehabilitation &  Therapy  [address]  P: [phone]  F: [phone] [x] [facility]  Outpatient Rehabilitation &  Therapy  [address]  P: [phone]  F: [phone] [] [facility]  Outpatient Rehabilitation &  Therapy  [address]  [phone]  [phone] [] [facility]  Outpatient Rehabilitation &  Therapy  [address]   P: [phone]  F: [phone]  [] [facility]  Outpatient Rehabilitation &  Therapy  [address]  P: [phone]  F: [phone] [] [facility]  Outpatient Rehabilitation &  Therapy  [address]  P: [phone]  F: [phone] [] [facility]  Outpatient Rehabilitation &  Therapy  [address]  P: [phone]  F: [phone] [] [facility]  Outpatient Rehabilitation &  Therapy  [address]  P: [phone]  F: [phone]  [] 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 Patient stated she first noted the knee pain when she became pregnant with her

## 2024-08-12 ENCOUNTER — HOSPITAL ENCOUNTER (OUTPATIENT)
Age: 34
Discharge: HOME | End: 2024-08-12

## 2024-08-12 ENCOUNTER — HOSPITAL ENCOUNTER (OUTPATIENT)
Dept: CT IMAGING | Age: 34
Discharge: HOME | End: 2024-08-14
Payer: COMMERCIAL

## 2024-08-12 DIAGNOSIS — M54.50 CHRONIC MIDLINE LOW BACK PAIN WITHOUT SCIATICA: ICD-10-CM

## 2024-08-12 DIAGNOSIS — R73.03 PREDIABETES: ICD-10-CM

## 2024-08-12 DIAGNOSIS — R10.31 CHRONIC RLQ PAIN: ICD-10-CM

## 2024-08-12 DIAGNOSIS — F41.9 ANXIETY: ICD-10-CM

## 2024-08-12 DIAGNOSIS — G89.29 CHRONIC MIDLINE LOW BACK PAIN WITHOUT SCIATICA: ICD-10-CM

## 2024-08-12 DIAGNOSIS — N91.2 AMENORRHEA: ICD-10-CM

## 2024-08-12 DIAGNOSIS — G89.29 CHRONIC RLQ PAIN: ICD-10-CM

## 2024-08-12 DIAGNOSIS — R79.9 ABNORMAL FINDING OF BLOOD CHEMISTRY, UNSPECIFIED: ICD-10-CM

## 2024-08-12 PROCEDURE — 2500000003 HC RX 250 WO HCPCS: Performed by: PHYSICIAN ASSISTANT

## 2024-08-12 PROCEDURE — 6360000004 HC RX CONTRAST MEDICATION: Performed by: PHYSICIAN ASSISTANT

## 2024-08-12 PROCEDURE — 74177 CT ABD & PELVIS W/CONTRAST: CPT

## 2024-08-12 PROCEDURE — 2580000003 HC RX 258: Performed by: PHYSICIAN ASSISTANT

## 2024-08-12 RX ORDER — 0.9 % SODIUM CHLORIDE 0.9 %
100 INTRAVENOUS SOLUTION INTRAVENOUS ONCE
Status: COMPLETED | OUTPATIENT
Start: 2024-08-12 | End: 2024-08-12

## 2024-08-12 RX ORDER — SODIUM CHLORIDE 0.9 % (FLUSH) 0.9 %
10 SYRINGE (ML) INJECTION PRN
Status: DISCONTINUED | OUTPATIENT
Start: 2024-08-12 | End: 2024-08-15 | Stop reason: HOSPADM

## 2024-08-12 RX ADMIN — SODIUM CHLORIDE 100 ML: 9 INJECTION, SOLUTION INTRAVENOUS at 16:18

## 2024-08-12 RX ADMIN — BARIUM SULFATE 450 ML: 20 SUSPENSION ORAL at 16:17

## 2024-08-12 RX ADMIN — IOPAMIDOL 75 ML: 755 INJECTION, SOLUTION INTRAVENOUS at 16:17

## 2024-08-12 RX ADMIN — SODIUM CHLORIDE, PRESERVATIVE FREE 10 ML: 5 INJECTION INTRAVENOUS at 16:18

## 2024-08-13 ENCOUNTER — HOSPITAL ENCOUNTER (OUTPATIENT)
Dept: PHYSICAL THERAPY | Facility: CLINIC | Age: 34
Setting detail: THERAPIES SERIES
Discharge: HOME | End: 2024-08-13
Attending: PODIATRIST
Payer: COMMERCIAL

## 2024-08-13 PROCEDURE — 97110 THERAPEUTIC EXERCISES: CPT

## 2024-08-13 NOTE — FLOWSHEET NOTE
[] [facility]  Outpatient Rehabilitation &  Therapy  [address]  [phone]  [phone] [] [facility]  Outpatient Rehabilitation &  Therapy  [address]  P: [phone]  F: [phone] [] [facility]  Outpatient Rehabilitation &  Therapy  [address]  P: [phone]  F: [phone] [] [facility]  Outpatient Rehabilitation &  Therapy  [address]  [phone]  [phone] [] [facility]  Outpatient Rehabilitation &  Therapy  [address]   P: [phone]  F: [phone]  [] [facility]  Outpatient Rehabilitation &  Therapy  [address]  P: [phone]  F: [phone] [] [facility]  Outpatient Rehabilitation &  Therapy  [address]  P: [phone]  F: [phone] [] [facility]  Outpatient Rehabilitation &  Therapy  [address]  P: [phone]  F: [phone] [] [facility]  Outpatient Rehabilitation &  Therapy  [address]  P: [phone]  F: [phone]  [] [facility] Outpatient Rehabilitation &  Therapy  [address]  P: [phone]  F: [phone]     Physical Therapy Daily Treatment Note    Date:  2024  Patient: Aykaa Coughlin             : 1990                      MRN: [id number]  Physician: MORENITA Ledezma                               Insurance: Tang Clark Dual - [place] Medicaid Secondary - 15/30 Visits Remain (per secondary ins) - Auth After Eval (primary) - No Hard Max (both ins)  Medical Diagnosis: M22.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

## 2024-08-20 ENCOUNTER — APPOINTMENT (OUTPATIENT)
Dept: PHYSICAL THERAPY | Facility: CLINIC | Age: 34
End: 2024-08-20
Attending: PODIATRIST
Payer: COMMERCIAL

## 2024-09-03 ENCOUNTER — HOSPITAL ENCOUNTER (OUTPATIENT)
Dept: PHYSICAL THERAPY | Facility: CLINIC | Age: 34
Setting detail: THERAPIES SERIES
Discharge: HOME | End: 2024-09-03
Attending: PODIATRIST
Payer: COMMERCIAL

## 2024-09-03 PROCEDURE — 97110 THERAPEUTIC EXERCISES: CPT

## 2024-09-03 NOTE — FLOWSHEET NOTE
Stretch  - 2 x daily - 7 x weekly - 2 reps - 60 hold  - Active Straight Leg Raise with Quad Set  - 2 x daily - 7 x weekly - 2 sets - 10 reps - 3 hold  - Supine Bridge  - 2 x daily - 7 x weekly - 2 sets - 10 reps - 3 hold  - Sidelying Hip Abduction  - 2 x daily - 7 x weekly - 2 sets - 10 reps  - Clamshell  - 1 x daily - 7 x weekly - 3 sets - 10 reps  - Supine Knee Extension Strengthening  - 2 x daily - 7 x weekly - 2 sets - 10 reps - 2 hold  - Seated Long Arc Quad  - 1 x daily - 7 x weekly - 3 sets - 10 reps  - Prone Quadriceps Stretch with Strap  - 1 x daily - 7 x weekly - 3 sets - 10 reps      Plan: [x] Continue current frequency toward long and short term goals.    [] Specific Instructions for subsequent treatments:       Time In: 1:02 PM            Time Out: 1:46 pm    Electronically signed by:  Randa Elmore PTA

## 2024-09-16 ENCOUNTER — HOSPITAL ENCOUNTER (OUTPATIENT)
Dept: PHYSICAL THERAPY | Facility: CLINIC | Age: 34
Setting detail: THERAPIES SERIES
Discharge: HOME | End: 2024-09-16
Attending: PODIATRIST
Payer: COMMERCIAL

## 2024-09-17 ENCOUNTER — OFFICE VISIT (OUTPATIENT)
Dept: ORTHOPEDIC SURGERY | Age: 34
End: 2024-09-17
Payer: COMMERCIAL

## 2024-09-17 VITALS — WEIGHT: 280 LBS | HEIGHT: 68 IN | BODY MASS INDEX: 42.44 KG/M2

## 2024-09-17 DIAGNOSIS — M22.2X1 PATELLOFEMORAL SYNDROME OF BOTH KNEES: Primary | ICD-10-CM

## 2024-09-17 DIAGNOSIS — M22.2X2 PATELLOFEMORAL SYNDROME OF BOTH KNEES: Primary | ICD-10-CM

## 2024-09-17 PROCEDURE — 99213 OFFICE O/P EST LOW 20 MIN: CPT | Performed by: PHYSICIAN ASSISTANT

## 2024-09-24 ENCOUNTER — HOSPITAL ENCOUNTER (OUTPATIENT)
Dept: PHYSICAL THERAPY | Facility: CLINIC | Age: 34
Setting detail: THERAPIES SERIES
Discharge: HOME | End: 2024-09-24
Attending: PODIATRIST
Payer: COMMERCIAL

## 2024-09-24 PROCEDURE — 97110 THERAPEUTIC EXERCISES: CPT

## 2024-10-07 ENCOUNTER — HOSPITAL ENCOUNTER (OUTPATIENT)
Dept: PHYSICAL THERAPY | Facility: CLINIC | Age: 34
Setting detail: THERAPIES SERIES
Discharge: HOME | End: 2024-10-07
Attending: PODIATRIST

## 2024-10-07 NOTE — FLOWSHEET NOTE
[] [facility]  Outpatient Rehabilitation &  Therapy  [address]  [phone]  [phone] [] [facility]  Outpatient Rehabilitation &  Therapy  [address]  P: [phone]  F: [phone] [x] [facility]  Outpatient Rehabilitation &  Therapy  [address]  P: [phone]  F: [phone] [] [facility]  Outpatient Rehabilitation &  Therapy  [address]  [phone]  [phone] [] [facility]  Outpatient Rehabilitation &  Therapy  [address]   P: [phone]  F: [phone]  [] [facility]  Outpatient Rehabilitation &  Therapy  [address]  P: [phone]  F: [phone] [] [facility]  Outpatient Rehabilitation &  Therapy  [address]  P: [phone]  F: [phone] [] [facility]  Outpatient Rehabilitation &  Therapy  [address]  P: [phone]  F: [phone] [] [facility]  Outpatient Rehabilitation &  Therapy  [address]  P: [phone]  F: [phone]  [] [facility] Outpatient Rehabilitation &  Therapy  [address]  P: [phone]  F: [phone]     Therapy Cancel/No Show note    Date: 10/7/2024  Patient: Ayaka Coughlin  : 1990  MRN: [id number]    Cancels/No Shows to date:     For today's appointment patient:    [x]  Cancelled    [] Rescheduled appointment    [] No-show     Reason given by patient:    []  Patient ill    []  Conflicting appointment    [] No transportation      [] Conflict with work    [x] No reason given    [] Weather related    [] COVID-19    [] Other:      Comments:       [x] Next appointment was confirmed    Electronically signed by: Shalom Nicholson PTA  '

## 2024-10-14 NOTE — FLOWSHEET NOTE
[] [facility]  Outpatient Rehabilitation &  Therapy  [address]  [phone]  [phone] [] [facility]  Outpatient Rehabilitation &  Therapy  [address]  P: [phone]  F: [phone] [x] [facility]  Outpatient Rehabilitation &  Therapy  [address]  P: [phone]  F: [phone] [] [facility]  Outpatient Rehabilitation &  Therapy  [address]  [phone]  [phone] [] [facility]  Outpatient Rehabilitation &  Therapy  [address]   P: [phone]  F: [phone]  [] [facility]  Outpatient Rehabilitation &  Therapy  [address]  P: [phone]  F: [phone] [] [facility]  Outpatient Rehabilitation &  Therapy  [address]  P: [phone]  F: [phone] [] [facility]  Outpatient Rehabilitation &  Therapy  [address]  P: [phone]  F: [phone] [] [facility]  Outpatient Rehabilitation &  Therapy  [address]  P: [phone]  F: [phone]  [] [facility] Outpatient Rehabilitation &  Therapy  [address]  P: [phone]  F: [phone]     Therapy Cancel/No Show note    Date: 10/14/2024  Patient: Ayaka Coughlin  : 1990  MRN: [id number]    Cancels/No Shows to date: 3/2    For today's appointment patient:    [x]  Cancelled    [] Rescheduled appointment    [] No-show     Reason given by patient:    []  Patient ill    []  Conflicting appointment    [x] No transportation      [] Conflict with work    [] No reason given    [] Weather related    [] COVID-19    [] Other:      Comments:       [x] Next appointment was confirmed    Electronically signed by: Randa Elmore PTA  '

## 2024-10-15 ENCOUNTER — HOSPITAL ENCOUNTER (OUTPATIENT)
Dept: PHYSICAL THERAPY | Facility: CLINIC | Age: 34
Setting detail: THERAPIES SERIES
Discharge: HOME | End: 2024-10-15
Attending: PODIATRIST

## 2024-10-21 ENCOUNTER — HOSPITAL ENCOUNTER (OUTPATIENT)
Dept: PHYSICAL THERAPY | Facility: CLINIC | Age: 34
Setting detail: THERAPIES SERIES
Discharge: HOME | End: 2024-10-21
Attending: PODIATRIST

## 2024-10-21 NOTE — FLOWSHEET NOTE
[] [facility]  Outpatient Rehabilitation &  Therapy  [address]  [phone]  [phone] [] [facility]  Outpatient Rehabilitation &  Therapy  [address]  P: [phone]  F: [phone] [x] [facility]  Outpatient Rehabilitation &  Therapy  [address]  P: [phone]  F: [phone] [] [facility]  Outpatient Rehabilitation &  Therapy  [address]  [phone]  [phone] [] [facility]  Outpatient Rehabilitation &  Therapy  [address]   P: [phone]  F: [phone]  [] [facility]  Outpatient Rehabilitation &  Therapy  [address]  P: [phone]  F: [phone] [] [facility]  Outpatient Rehabilitation &  Therapy  [address]  P: [phone]  F: [phone] [] [facility]  Outpatient Rehabilitation &  Therapy  [address]  P: [phone]  F: [phone] [] [facility]  Outpatient Rehabilitation &  Therapy  [address]  P: [phone]  F: [phone]  [] [facility] Outpatient Rehabilitation &  Therapy  [address]  P: [phone]  F: [phone]     Therapy Cancel/No Show note    Date: 10/21/2024  Patient: Ayaka Coughlin  : 1990  MRN: [id number]    Cancels/No Shows to date:     For today's appointment patient:    [x]  Cancelled    [] Rescheduled appointment    [] No-show     Reason given by patient:    []  Patient ill    []  Conflicting appointment    [x] No transportation      [] Conflict with work    [] No reason given    [] Weather related    [] COVID-19    [x] Other:      Comments:  Per voice message, \"her dad has to work and she cancelled next week too. She will call back because she may have to reschedule into next week\"      [] Next appointment was

## 2024-10-23 NOTE — FLOWSHEET NOTE
[] [facility] &  Therapy  [address]    [phone]  F: [phone]   [] [address]  [address]   [address]  P: [phone]  F: [phone]  [] [address] &  Therapy  [address]  P: [phone]  F: [phone] [] [facility]  P: [phone]  F: [phone]  [] [address]  [address]   [address]   [place]: [phone]  F: [phone]   [] [address] [facility]  [address]  [place]: [phone]   F: [phone]     Physical Therapy Cancel/No Show note    Date: 2023  Patient: Sandra Lopez  : 1990  MRN: [id number]    Cancels/No Shows to date:     For today's appointment patient:    [x]  Cancelled    [] Rescheduled appointment    [] No-show     Reason given by patient:    []  Patient ill    []  Conflicting appointment    [] No transportation      [] Conflict with work    [] No reason given    [] Weather related    [] COVID-19    [] Other:      Comments:  Has not been seen in over a month. Needs re-eval with primary therapist. LVM informing pt.        [] Next appointment was confirmed    Electronically signed by: Krupa Contreras PTA
Yes

## 2024-12-11 ENCOUNTER — CLINICAL DOCUMENTATION (OUTPATIENT)
Dept: PHYSICAL THERAPY | Facility: CLINIC | Age: 34
End: 2024-12-11

## 2024-12-11 NOTE — DISCHARGE SUMMARY
[] [facility]  Outpatient Rehabilitation &  Therapy  [address]  [phone]  [phone] [] [facility]  Outpatient Rehabilitation &  Therapy  [address]  P: [phone]  F: [phone] [x] [facility]  Outpatient Rehabilitation &  Therapy  [address]  P: [phone]  F: [phone] [] [facility]  Outpatient Rehabilitation &  Therapy  [address]  [phone]  [phone] [] [facility]  Outpatient Rehabilitation &  Therapy  [address]   P: [phone]  F: [phone]  [] [facility]  Outpatient Rehabilitation &  Therapy  [address]  P: [phone]  F: [phone] [] [facility]  Outpatient Rehabilitation &  Therapy  [address]  P: [phone]  F: [phone] [] [facility]  Outpatient Rehabilitation &  Therapy  [address]  P: [phone]  F: [phone] [] [facility]  Outpatient Rehabilitation &  Therapy  [address]  P: [phone]  F: [phone]  [] [facility] Outpatient Rehabilitation &  Therapy  [address]  P: [phone]  F: [phone]     Physical Therapy Discharge Note    Date: 2024      Patient: Ayaka Coughlin  : 1990  MRN: [id number]    Physician: MORENITA Ledezma                               Insurance: Tang Clark Dual - [place] Medicaid Secondary - 15/30 Visits Remain (per secondary ins) - Auth After Eval (primary) - No Hard Max (both ins)   Approved visits valid:  24-24 - CPT Codes: 00958, 55807, 84212, 10060, 40581 (12visits);  Auth# [id number]  Medical Diagnosis: 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